# Patient Record
Sex: FEMALE | Race: WHITE | NOT HISPANIC OR LATINO
[De-identification: names, ages, dates, MRNs, and addresses within clinical notes are randomized per-mention and may not be internally consistent; named-entity substitution may affect disease eponyms.]

---

## 2017-01-05 ENCOUNTER — OTHER (OUTPATIENT)
Age: 32
End: 2017-01-05

## 2017-01-25 ENCOUNTER — APPOINTMENT (OUTPATIENT)
Dept: NEUROLOGY | Facility: CLINIC | Age: 32
End: 2017-01-25

## 2017-01-25 VITALS
HEART RATE: 91 BPM | OXYGEN SATURATION: 97 % | BODY MASS INDEX: 53.92 KG/M2 | DIASTOLIC BLOOD PRESSURE: 64 MMHG | SYSTOLIC BLOOD PRESSURE: 95 MMHG | WEIGHT: 293 LBS | HEIGHT: 62 IN

## 2017-01-25 RX ORDER — LEVETIRACETAM 750 MG/1
750 TABLET, FILM COATED ORAL TWICE DAILY
Qty: 120 | Refills: 5 | Status: ACTIVE | COMMUNITY
Start: 2017-01-25 | End: 1900-01-01

## 2017-01-25 RX ORDER — LAMOTRIGINE 25 MG/1
25 TABLET ORAL
Refills: 0 | Status: ACTIVE | COMMUNITY

## 2017-02-01 ENCOUNTER — OUTPATIENT (OUTPATIENT)
Dept: OUTPATIENT SERVICES | Facility: HOSPITAL | Age: 32
LOS: 1 days | End: 2017-02-01
Payer: COMMERCIAL

## 2017-02-01 DIAGNOSIS — Z90.89 ACQUIRED ABSENCE OF OTHER ORGANS: Chronic | ICD-10-CM

## 2017-02-01 PROCEDURE — 70553 MRI BRAIN STEM W/O & W/DYE: CPT | Mod: 26

## 2017-02-01 PROCEDURE — A9585: CPT

## 2017-02-01 PROCEDURE — 70553 MRI BRAIN STEM W/O & W/DYE: CPT

## 2017-02-01 PROCEDURE — 76376 3D RENDER W/INTRP POSTPROCES: CPT | Mod: 26

## 2017-02-06 ENCOUNTER — INPATIENT (INPATIENT)
Facility: HOSPITAL | Age: 32
LOS: 14 days | Discharge: HOME | DRG: 101 | End: 2017-02-21
Attending: PSYCHIATRY & NEUROLOGY | Admitting: PSYCHIATRY & NEUROLOGY
Payer: COMMERCIAL

## 2017-02-06 VITALS — TEMPERATURE: 98 F | WEIGHT: 293 LBS

## 2017-02-06 DIAGNOSIS — R63.8 OTHER SYMPTOMS AND SIGNS CONCERNING FOOD AND FLUID INTAKE: ICD-10-CM

## 2017-02-06 DIAGNOSIS — R56.9 UNSPECIFIED CONVULSIONS: ICD-10-CM

## 2017-02-06 DIAGNOSIS — I45.6 PRE-EXCITATION SYNDROME: ICD-10-CM

## 2017-02-06 DIAGNOSIS — E66.01 MORBID (SEVERE) OBESITY DUE TO EXCESS CALORIES: ICD-10-CM

## 2017-02-06 DIAGNOSIS — Z90.89 ACQUIRED ABSENCE OF OTHER ORGANS: Chronic | ICD-10-CM

## 2017-02-06 DIAGNOSIS — E78.5 HYPERLIPIDEMIA, UNSPECIFIED: ICD-10-CM

## 2017-02-06 DIAGNOSIS — Z41.8 ENCOUNTER FOR OTHER PROCEDURES FOR PURPOSES OTHER THAN REMEDYING HEALTH STATE: ICD-10-CM

## 2017-02-06 LAB
ANION GAP SERPL CALC-SCNC: 9 MMOL/L — SIGNIFICANT CHANGE UP (ref 9–16)
BUN SERPL-MCNC: 16 MG/DL — SIGNIFICANT CHANGE UP (ref 7–23)
CALCIUM SERPL-MCNC: 8.4 MG/DL — LOW (ref 8.5–10.5)
CHLORIDE SERPL-SCNC: 105 MMOL/L — SIGNIFICANT CHANGE UP (ref 96–108)
CO2 SERPL-SCNC: 27 MMOL/L — SIGNIFICANT CHANGE UP (ref 22–31)
CREAT SERPL-MCNC: 0.62 MG/DL — SIGNIFICANT CHANGE UP (ref 0.5–1.3)
GLUCOSE SERPL-MCNC: 120 MG/DL — HIGH (ref 70–99)
HCG UR QL: NEGATIVE — SIGNIFICANT CHANGE UP
HCT VFR BLD CALC: 38.8 % — SIGNIFICANT CHANGE UP (ref 34.5–45)
HGB BLD-MCNC: 13 G/DL — SIGNIFICANT CHANGE UP (ref 11.5–15.5)
MAGNESIUM SERPL-MCNC: 2.1 MG/DL — SIGNIFICANT CHANGE UP (ref 1.6–2.4)
MCHC RBC-ENTMCNC: 27.7 PG — SIGNIFICANT CHANGE UP (ref 27–34)
MCHC RBC-ENTMCNC: 33.5 G/DL — SIGNIFICANT CHANGE UP (ref 32–36)
MCV RBC AUTO: 82.7 FL — SIGNIFICANT CHANGE UP (ref 80–100)
PLATELET # BLD AUTO: 226 K/UL — SIGNIFICANT CHANGE UP (ref 150–400)
POTASSIUM SERPL-MCNC: 3.7 MMOL/L — SIGNIFICANT CHANGE UP (ref 3.5–5.3)
POTASSIUM SERPL-SCNC: 3.7 MMOL/L — SIGNIFICANT CHANGE UP (ref 3.5–5.3)
RBC # BLD: 4.69 M/UL — SIGNIFICANT CHANGE UP (ref 3.8–5.2)
RBC # FLD: 13.6 % — SIGNIFICANT CHANGE UP (ref 10.3–16.9)
SODIUM SERPL-SCNC: 141 MMOL/L — SIGNIFICANT CHANGE UP (ref 135–145)
WBC # BLD: 9.2 K/UL — SIGNIFICANT CHANGE UP (ref 3.8–10.5)
WBC # FLD AUTO: 9.2 K/UL — SIGNIFICANT CHANGE UP (ref 3.8–10.5)

## 2017-02-06 PROCEDURE — 93010 ELECTROCARDIOGRAM REPORT: CPT

## 2017-02-06 RX ORDER — LAMOTRIGINE 25 MG/1
25 TABLET, ORALLY DISINTEGRATING ORAL DAILY
Qty: 0 | Refills: 0 | Status: DISCONTINUED | OUTPATIENT
Start: 2017-02-06 | End: 2017-02-07

## 2017-02-06 RX ORDER — LEVETIRACETAM 250 MG/1
1500 TABLET, FILM COATED ORAL
Qty: 0 | Refills: 0 | Status: DISCONTINUED | OUTPATIENT
Start: 2017-02-06 | End: 2017-02-09

## 2017-02-06 RX ORDER — ESCITALOPRAM OXALATE 10 MG/1
10 TABLET, FILM COATED ORAL DAILY
Qty: 0 | Refills: 0 | Status: DISCONTINUED | OUTPATIENT
Start: 2017-02-06 | End: 2017-02-06

## 2017-02-06 RX ORDER — HEPARIN SODIUM 5000 [USP'U]/ML
7500 INJECTION INTRAVENOUS; SUBCUTANEOUS EVERY 8 HOURS
Qty: 0 | Refills: 0 | Status: DISCONTINUED | OUTPATIENT
Start: 2017-02-06 | End: 2017-02-21

## 2017-02-06 RX ADMIN — LEVETIRACETAM 1500 MILLIGRAM(S): 250 TABLET, FILM COATED ORAL at 22:38

## 2017-02-06 NOTE — H&P ADULT. - PROBLEM SELECTOR PLAN 2
Pt reports hx of ablation that did not resolve issue.  Pt also had a recent Holter monitor for 30 days that was insignificant for arrhythmias.    - F/u EKG Pt reports hx of ablation that did not resolve issue.  Pt also had a recent Holter monitor for 30 days that was insignificant for arrhythmias.    - F/u EKG  - monitor in [facility] on telemetry.

## 2017-02-06 NOTE — H&P ADULT. - PROBLEM SELECTOR PLAN 1
Previous vEEG showing R temporal sharp waves during sleep. Previous MRIs showed no significant findings. MRI brain w/o contrast on 11/28 showing hyperintensity along the anterior and mesial right cortical temporal lobe.  - Start on VEEG  - C/w Lamictal 150 mg BID  - C/w Keppra 1500 mg BID  - MRI brain w/wo con, epi protocol Previous vEEG showing R temporal sharp waves during sleep. MRI brain w/o contrast on 11/28 showing hyperintensity along the anterior and mesial right cortical temporal lobe.  - Start on VEEG  - C/w Lamictal 150 mg BID  - C/w Keppra 1500 mg BID  - MRI brain w/wo con, epi protocol

## 2017-02-06 NOTE — H&P ADULT. - ASSESSMENT
32 y/o RH F PMHx WPW (s/p ablation), HLD, morbid obesity, presents w/ 7 months of intermittent left hand shaking and memory loss. 30 y/o RH F PMHx WPW (s/p ablation), HLD, morbid obesity, presents w/ 7 months of intermittent left hand shaking and memory loss.

## 2017-02-06 NOTE — H&P ADULT. - RS GEN PE MLT RESP DETAILS PC
good air movement/breath sounds equal/no wheezes/normal/airway patent/clear to auscultation bilaterally/no rales
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
## 2017-02-07 PROCEDURE — 95951: CPT | Mod: 26

## 2017-02-07 PROCEDURE — 99222 1ST HOSP IP/OBS MODERATE 55: CPT

## 2017-02-07 RX ORDER — LAMOTRIGINE 25 MG/1
150 TABLET, ORALLY DISINTEGRATING ORAL EVERY 12 HOURS
Qty: 0 | Refills: 0 | Status: DISCONTINUED | OUTPATIENT
Start: 2017-02-07 | End: 2017-02-15

## 2017-02-07 RX ORDER — LAMOTRIGINE 25 MG/1
150 TABLET, ORALLY DISINTEGRATING ORAL
Qty: 0 | Refills: 0 | Status: DISCONTINUED | OUTPATIENT
Start: 2017-02-07 | End: 2017-02-07

## 2017-02-07 RX ORDER — ACETAMINOPHEN 500 MG
650 TABLET ORAL ONCE
Qty: 0 | Refills: 0 | Status: COMPLETED | OUTPATIENT
Start: 2017-02-07 | End: 2017-02-07

## 2017-02-07 RX ADMIN — LEVETIRACETAM 1500 MILLIGRAM(S): 250 TABLET, FILM COATED ORAL at 17:06

## 2017-02-07 RX ADMIN — LAMOTRIGINE 150 MILLIGRAM(S): 25 TABLET, ORALLY DISINTEGRATING ORAL at 23:39

## 2017-02-07 RX ADMIN — LAMOTRIGINE 150 MILLIGRAM(S): 25 TABLET, ORALLY DISINTEGRATING ORAL at 12:55

## 2017-02-07 RX ADMIN — Medication 650 MILLIGRAM(S): at 09:04

## 2017-02-07 RX ADMIN — LEVETIRACETAM 1500 MILLIGRAM(S): 250 TABLET, FILM COATED ORAL at 06:32

## 2017-02-07 NOTE — PROGRESS NOTE ADULT - ASSESSMENT
32 y/o RH F PMHx WPW (s/p ablation), HLD, morbid obesity, presents w/ 7 months of intermittent left hand shaking and memory loss.

## 2017-02-07 NOTE — DIETITIAN INITIAL EVALUATION ADULT. - OTHER INFO
Pt admitted for 48hr VEEG; for seizure like activity. Pt has had 7 months of handshaking and memory loss. Pt began taking Keppra 7 months ago when her appetite started to decrease. PTA pt consumed a regular diet of mostly  cuisine; cereal for breakfast, rice and dempsey for lunch, vegetables and protein for dinner. Pt currently ordered for a regular diet. Pt reports fair appetite; consuming ~50% of needs from meals. No N/V/D/C, pain w/ swallowing or mechanical issues. Skin WDL.

## 2017-02-07 NOTE — DIETITIAN INITIAL EVALUATION ADULT. - PROBLEM SELECTOR PLAN 1
Previous vEEG showing R temporal sharp waves during sleep. Previous MRIs showed no significant findings. MRI brain w/o contrast on 11/28 showing hyperintensity along the anterior and mesial right cortical temporal lobe.  - Start on VEEG  - C/w Lamictal 150 mg BID  - C/w Keppra 1500 mg BID  - MRI brain w/wo con, epi protocol

## 2017-02-07 NOTE — DIETITIAN INITIAL EVALUATION ADULT. - NS AS NUTRI INTERV ED CONTENT
Recommended modifications/Nutrition relationship to health/disease/Purpose of the nutrition education

## 2017-02-07 NOTE — PROGRESS NOTE ADULT - SUBJECTIVE AND OBJECTIVE BOX
Hospital Course:  30 y/o RH F PMHx WPW (s/p ablation), HLD, morbid obesity, presents w/ 7 months of intermittent left hand shaking and memory loss. 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 The nurses with her told her that she had b/l facial palsy, had left hand shaking and confusion/unresponsiveness for a few minutes; no loss of consciousness or bowel/urinary incontinence. After this first episode, patient was told by ED that it was likely her WPW. Pt reports that since this episode, she has had nearly 50 witnessed similar episodes. After these episodes she has noticed that she is lethargic for a few hours.  She had an MRI brain w/w/o contrast on 8/2/16 which was normal.  On 9/26 patient had a 30 day cardiac monitor which did not show any arrhythmias. On 10/1 she had a continuous vEEG that showed R temporal sharp waves during sleep. On 10/17, patient was started on Lexapro as her doctor thought this was possibly anxiety related. She is currently on [address] [facility] for cardiac monitoring while her antiepileptic mediations are adjusted.     Overnight Events: Incorrect Lamictal dose entered.  Pt normally on 150 mg BID, but was ordered as 25 mg.  Pt took one dose at home prior to coming to hospital.  Did not receive evening dose, Dr. Summers unable to be reached O/N by night team for clarification.  However, this morning, Dr. Dyson clarified that he was on call.  Pt did not experience seizure O/N.     Subjective: Patient seen and examined at bedside. Pt denies further seizure episodes.  Denies HA, changes in vision, CP, SOB, palpitations, abdominal pain.     [OBJECTIVE]:    Vital Signs:  T(F): 98.4, Max: 98.4 (02-07 @ 00:40)  HR: 92 (80 - 116)  BP: 124/74 (115/61 - 146/71)  BP(mean): 94 (81 - 97)  RR: 17 (16 - 19)  SpO2: 96% (94% - 97%)  Wt(kg): --  CVP(cm H2O): --      CAPILLARY BLOOD GLUCOSE      Physical Exam:    General: NAD, Comfortable, Pleasant  HEENT: PERRL, no scleral icterus, no ptosis, MMM, VEEG leads in place  Respiratory: CTA b/l, no wheezes, rales or rhonchi  Cardiovascular: Regular, +S1 + S2  Abdomen: Soft, NT, normoactive bowel sounds  Extremities: pulses equal, no calf tenderness  Skin: No rashes  Neurological: follows commands, moves all extremities    Medications:  MEDICATIONS  (STANDING):  heparin  Injectable 7500Unit(s) SubCutaneous every 8 hours  levETIRAcetam 1500milliGRAM(s) Oral two times a day  lamoTRIgine 150milliGRAM(s) Oral every 12 hours    MEDICATIONS  (PRN):      Allergies    No Known Allergies    Intolerances        Labs:                        13.0   9.2   )-----------( 226      ( 06 Feb 2017 18:31 )             38.8     06 Feb 2017 18:31    141    |  105    |  16     ----------------------------<  120    3.7     |  27     |  0.62     Ca    8.4        06 Feb 2017 18:31  Mg     2.1       06 Feb 2017 18:31            Radiology and other tests:    T(F): 98.4, Max: 98.4 (02-07 @ 00:40)  HR: 92 (80 - 116)  BP: 124/74 (115/61 - 146/71)  BP(mean): 94 (81 - 97)  RR: 17 (16 - 19)  SpO2: 96% (94% - 97%)  Wt(kg): --  CVP(cm H2O): -- Hospital Course:  32 y/o RH F PMHx WPW (s/p ablation), HLD, morbid obesity, presents w/ 7 months of intermittent left hand shaking and memory loss. 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 The nurses with her told her that she had b/l facial palsy, had left hand shaking and confusion/unresponsiveness for a few minutes; no loss of consciousness or bowel/urinary incontinence. After this first episode, patient was told by ED that it was likely her WPW. Pt reports that since this episode, she has had nearly 50 witnessed similar episodes. After these episodes she has noticed that she is lethargic for a few hours.  She had an MRI brain w/w/o contrast on 8/2/16 which was normal.  On 9/26 patient had a 30 day cardiac monitor which did not show any arrhythmias. On 10/1 she had a continuous vEEG that showed R temporal sharp waves during sleep. On 10/17, patient was started on Lexapro as her doctor thought this was possibly anxiety related. She is currently on [facility] for cardiac monitoring while her antiepileptic mediations are adjusted.     Overnight Events: Incorrect Lamictal dose entered.  Pt normally on 150 mg BID, but was ordered as 25 mg.  Pt took one dose at home prior to coming to hospital.  Did not receive evening dose, Dr. Summers unable to be reached O/N by night team for clarification.  However, this morning, Dr. Dyson clarified that he was on call.      Subjective: Patient seen and examined at bedside. Pt did not experience seizure O/N. Denies HA, changes in vision, CP, SOB, palpitations, abdominal pain.     [OBJECTIVE]:    Vital Signs:  T(F): 98.4, Max: 98.4 (02-07 @ 00:40)  HR: 92 (80 - 116)  BP: 124/74 (115/61 - 146/71)  BP(mean): 94 (81 - 97)  RR: 17 (16 - 19)  SpO2: 96% (94% - 97%)  Wt(kg): --  CVP(cm H2O): --      CAPILLARY BLOOD GLUCOSE      Physical Exam:    General: NAD, Comfortable, Pleasant  HEENT: PERRL, no scleral icterus, no ptosis, MMM, VEEG leads in place  Respiratory: CTA b/l, no wheezes, rales or rhonchi  Cardiovascular: Regular, +S1 + S2  Abdomen: Soft, NT, normoactive bowel sounds  Extremities: pulses equal, no calf tenderness  Skin: No rashes  Neurological: follows commands, moves all extremities    Medications:  MEDICATIONS  (STANDING):  heparin  Injectable 7500Unit(s) SubCutaneous every 8 hours  levETIRAcetam 1500milliGRAM(s) Oral two times a day  lamoTRIgine 150milliGRAM(s) Oral every 12 hours    MEDICATIONS  (PRN):      Allergies    No Known Allergies    Intolerances        Labs:                        13.0   9.2   )-----------( 226      ( 06 Feb 2017 18:31 )             38.8     06 Feb 2017 18:31    141    |  105    |  16     ----------------------------<  120    3.7     |  27     |  0.62     Ca    8.4        06 Feb 2017 18:31  Mg     2.1       06 Feb 2017 18:31            Radiology and other tests:    T(F): 98.4, Max: 98.4 (02-07 @ 00:40)  HR: 92 (80 - 116)  BP: 124/74 (115/61 - 146/71)  BP(mean): 94 (81 - 97)  RR: 17 (16 - 19)  SpO2: 96% (94% - 97%)  Wt(kg): --  CVP(cm H2O): --

## 2017-02-07 NOTE — DIETITIAN INITIAL EVALUATION ADULT. - ENERGY NEEDS
Height: 157.48cm Weight: 142kg, IBW 50kg+/-10%, %%, BMI 57.3  IBW used to calculate energy needs due to pt's current body weight exceeding 120% of IBW   Increased kcal/protein needs due to obesity. Height: 157.48cm Weight: 142kg, IBW 50kg+/-10%, %%, BMI 57.3  IBW used to calculate energy needs due to pt's current body weight exceeding 120% of IBW   ABW used to determine kcal needs secondary to morbid obesity & ASPEN guidelines:  11-14 kcal/kg ABW (0653-0907 kcal).   Increased protein needs due to obesity.

## 2017-02-07 NOTE — DIETITIAN INITIAL EVALUATION ADULT. - NS AS NUTRI INTERV ED CONTENT3
Nutrition relationship to health/disease/Purpose of the nutrition education/Healthful diet, wt loss tips, meal preparation tips, and increased physical activity/Recommended modifications

## 2017-02-07 NOTE — DIETITIAN INITIAL EVALUATION ADULT. - PROBLEM SELECTOR PLAN 2
Pt reports hx of ablation that did not resolve issue.  Pt also had a recent Holter monitor for 30 days that was insignificant for arrhythmias.    - F/u EKG

## 2017-02-08 PROCEDURE — 99232 SBSQ HOSP IP/OBS MODERATE 35: CPT

## 2017-02-08 PROCEDURE — 95951: CPT | Mod: 26

## 2017-02-08 RX ADMIN — LEVETIRACETAM 1500 MILLIGRAM(S): 250 TABLET, FILM COATED ORAL at 18:29

## 2017-02-08 RX ADMIN — LAMOTRIGINE 150 MILLIGRAM(S): 25 TABLET, ORALLY DISINTEGRATING ORAL at 12:15

## 2017-02-08 RX ADMIN — LEVETIRACETAM 1500 MILLIGRAM(S): 250 TABLET, FILM COATED ORAL at 07:07

## 2017-02-08 NOTE — PROGRESS NOTE ADULT - PROBLEM SELECTOR PLAN 2
Pt reports hx of ablation that did not resolve issue.  Pt also had a recent Holter monitor for 30 days that was insignificant for arrhythmias.    - continue to monitor

## 2017-02-08 NOTE — PROGRESS NOTE ADULT - SUBJECTIVE AND OBJECTIVE BOX
INTERVAL HPI/OVERNIGHT EVENTS:    OVERNIGHT: No overnight events.  SUBJECTIVE: Patient seen and examined at bedside.     ROS:  CV: Denies chest pain  Resp: Denies SOB  GI: Denies abdominal pain, constipation, diarrhea, nausea, vomiting  : Denies dysuria  ID: Denies fevers, chills  MSK: Denies joint pain     OBJECTIVE:    VITAL SIGNS:  ICU Vital Signs Last 24 Hrs  T(C): 36.7, Max: 36.9 (02-07 @ 13:26)  T(F): 98.1, Max: 98.4 (02-07 @ 13:26)  HR: 90 (84 - 104)  BP: 112/56 (112/56 - 137/65)  BP(mean): 77 (77 - 94)  ABP: --  ABP(mean): --  RR: 19 (16 - 19)  SpO2: 95% (92% - 96%)        I & Os for current day (as of 02-08 @ 06:02)  =============================================  IN: 100 ml / OUT: 0 ml / NET: 100 ml    CAPILLARY BLOOD GLUCOSE      PHYSICAL EXAM:    General: NAD, comfortable  HEENT: NCAT, PERRL, clear conjunctiva, no scleral icterus  Neck: supple, no JVD  Respiratory: CTA b/l, no wheezing, rhonchi, rales  Cardiovascular: RRR, normal S1S2, no M/R/G  Abdomen: soft, NT/ND, bowel sounds in all four quadrants, no palpable masses  Extremities: WWP, no clubbing, cyanosis, or edema  Neuro:     MEDICATIONS:  MEDICATIONS  (STANDING):  heparin  Injectable 7500Unit(s) SubCutaneous every 8 hours  levETIRAcetam 1500milliGRAM(s) Oral two times a day  lamoTRIgine 150milliGRAM(s) Oral every 12 hours    MEDICATIONS  (PRN):      ALLERGIES:  Allergies    No Known Allergies    Intolerances        LABS:                        13.0   9.2   )-----------( 226      ( 06 Feb 2017 18:31 )             38.8     06 Feb 2017 18:31    141    |  105    |  16     ----------------------------<  120    3.7     |  27     |  0.62     Ca    8.4        06 Feb 2017 18:31  Mg     2.1       06 Feb 2017 18:31            RADIOLOGY & ADDITIONAL TESTS: Reviewed. INTERVAL HPI/OVERNIGHT EVENTS:    OVERNIGHT: No overnight events.  SUBJECTIVE: Patient seen and examined at bedside. Reports she had a frontal headache yesterday that resolved on its own. Denies having any neurologic episodes that she came in with. Denies any focal neurologic deficits such as weakness or numbness/tingling sensation      ROS:  CV: Denies chest pain  Resp: Denies SOB  GI: Had a BM yesterday. Denies abdominal pain, constipation, diarrhea, nausea, vomiting  : Denies dysuria  ID: Denies fevers, chills  MSK: Denies joint pain     OBJECTIVE:    VITAL SIGNS:  ICU Vital Signs Last 24 Hrs  T(C): 36.7, Max: 36.9 (02-07 @ 13:26)  T(F): 98.1, Max: 98.4 (02-07 @ 13:26)  HR: 90 (84 - 104)  BP: 112/56 (112/56 - 137/65)  BP(mean): 77 (77 - 94)  ABP: --  ABP(mean): --  RR: 19 (16 - 19)  SpO2: 95% (92% - 96%)        I & Os for current day (as of 02-08 @ 06:02)  =============================================  IN: 100 ml / OUT: 0 ml / NET: 100 ml    CAPILLARY BLOOD GLUCOSE      PHYSICAL EXAM:    General: NAD, comfortable, pleasant, obese   HEENT: NCAT, PERRL, clear conjunctiva, no scleral icterus  Neck: supple, no JVD  Respiratory: trace, no wheezing, rhonchi, rales  Cardiovascular: RRR, normal S1S2, no M/R/G  Abdomen: soft, NT/ND, bowel sounds in all four quadrants, no palpable masses  Extremities: WWP, no clubbing, cyanosis, or edema  Neuro:     MEDICATIONS:  MEDICATIONS  (STANDING):  heparin  Injectable 7500Unit(s) SubCutaneous every 8 hours  levETIRAcetam 1500milliGRAM(s) Oral two times a day  lamoTRIgine 150milliGRAM(s) Oral every 12 hours    MEDICATIONS  (PRN):      ALLERGIES:  Allergies    No Known Allergies    Intolerances        LABS:                        13.0   9.2   )-----------( 226      ( 06 Feb 2017 18:31 )             38.8     06 Feb 2017 18:31    141    |  105    |  16     ----------------------------<  120    3.7     |  27     |  0.62     Ca    8.4        06 Feb 2017 18:31  Mg     2.1       06 Feb 2017 18:31            RADIOLOGY & ADDITIONAL TESTS: Reviewed. INTERVAL HPI/OVERNIGHT EVENTS:    OVERNIGHT: No overnight events.  SUBJECTIVE: Patient seen and examined at bedside. Reports she had a frontal headache yesterday that resolved on its own. Denies having any neurologic episodes that she came in with. Denies any focal neurologic deficits such as weakness or numbness/tingling sensation      ROS:  CV: Denies chest pain  Resp: Denies SOB  GI: Had a BM yesterday. Denies abdominal pain, constipation, diarrhea, nausea, vomiting  : Denies dysuria  ID: Denies fevers, chills  MSK: Denies joint pain     OBJECTIVE:    VITAL SIGNS:  ICU Vital Signs Last 24 Hrs  T(C): 36.7, Max: 36.9 (02-07 @ 13:26)  T(F): 98.1, Max: 98.4 (02-07 @ 13:26)  HR: 90 (84 - 104)  BP: 112/56 (112/56 - 137/65)  BP(mean): 77 (77 - 94)  ABP: --  ABP(mean): --  RR: 19 (16 - 19)  SpO2: 95% (92% - 96%)        I & Os for current day (as of 02-08 @ 06:02)  =============================================  IN: 100 ml / OUT: 0 ml / NET: 100 ml    CAPILLARY BLOOD GLUCOSE      PHYSICAL EXAM:    General: NAD, comfortable, pleasant, obese   HEENT: NCAT, PERRL, clear conjunctiva, no scleral icterus  Neck: supple, no JVD  Respiratory: trace crackles heard on the left base, no wheezing and rhonchi,   Cardiovascular: RRR, normal S1S2, no M/R/G  Abdomen: soft, NT/ND, +bowel sounds, no palpable masses  Extremities: WWP, no clubbing, cyanosis, or edema  Neuro: CN 2-12 intact FTN intact B/L     MEDICATIONS:  MEDICATIONS  (STANDING):  heparin  Injectable 7500Unit(s) SubCutaneous every 8 hours  levETIRAcetam 1500milliGRAM(s) Oral two times a day  lamoTRIgine 150milliGRAM(s) Oral every 12 hours    MEDICATIONS  (PRN):      ALLERGIES:  Allergies    No Known Allergies    Intolerances        LABS:                        13.0   9.2   )-----------( 226      ( 06 Feb 2017 18:31 )             38.8     06 Feb 2017 18:31    141    |  105    |  16     ----------------------------<  120    3.7     |  27     |  0.62     Ca    8.4        06 Feb 2017 18:31  Mg     2.1       06 Feb 2017 18:31            RADIOLOGY & ADDITIONAL TESTS: Reviewed.

## 2017-02-08 NOTE — PROGRESS NOTE ADULT - PROBLEM SELECTOR PLAN 1
Previous vEEG showing R temporal sharp waves during sleep. Previous MRIs showed no significant findings. MRI brain w/o contrast on 11/28 showing hyperintensity along the anterior and mesial right cortical temporal lobe.  - c/w VEEG  - C/w Lamictal 150 mg BID  - C/w Keppra 1500 mg BID  - will conduct sleep deprivation study tonight to stimulate activity; patient aware - c/w VEEG  - C/w Lamictal 150 mg BID  - C/w Keppra 1500 mg BID  - will conduct sleep deprivation study tonight; patient aware  - neuropsych testing today for pre-surgical work-up.

## 2017-02-09 LAB
ANION GAP SERPL CALC-SCNC: 10 MMOL/L — SIGNIFICANT CHANGE UP (ref 9–16)
BUN SERPL-MCNC: 15 MG/DL — SIGNIFICANT CHANGE UP (ref 7–23)
CALCIUM SERPL-MCNC: 8 MG/DL — LOW (ref 8.5–10.5)
CHLORIDE SERPL-SCNC: 105 MMOL/L — SIGNIFICANT CHANGE UP (ref 96–108)
CO2 SERPL-SCNC: 24 MMOL/L — SIGNIFICANT CHANGE UP (ref 22–31)
CREAT SERPL-MCNC: 0.65 MG/DL — SIGNIFICANT CHANGE UP (ref 0.5–1.3)
GLUCOSE SERPL-MCNC: 107 MG/DL — HIGH (ref 70–99)
HCT VFR BLD CALC: 36.6 % — SIGNIFICANT CHANGE UP (ref 34.5–45)
HGB BLD-MCNC: 12.2 G/DL — SIGNIFICANT CHANGE UP (ref 11.5–15.5)
MAGNESIUM SERPL-MCNC: 2.3 MG/DL — SIGNIFICANT CHANGE UP (ref 1.6–2.4)
MCHC RBC-ENTMCNC: 27.5 PG — SIGNIFICANT CHANGE UP (ref 27–34)
MCHC RBC-ENTMCNC: 33.3 G/DL — SIGNIFICANT CHANGE UP (ref 32–36)
MCV RBC AUTO: 82.4 FL — SIGNIFICANT CHANGE UP (ref 80–100)
PLATELET # BLD AUTO: 203 K/UL — SIGNIFICANT CHANGE UP (ref 150–400)
POTASSIUM SERPL-MCNC: 3.6 MMOL/L — SIGNIFICANT CHANGE UP (ref 3.5–5.3)
POTASSIUM SERPL-SCNC: 3.6 MMOL/L — SIGNIFICANT CHANGE UP (ref 3.5–5.3)
RBC # BLD: 4.44 M/UL — SIGNIFICANT CHANGE UP (ref 3.8–5.2)
RBC # FLD: 13.6 % — SIGNIFICANT CHANGE UP (ref 10.3–16.9)
SODIUM SERPL-SCNC: 139 MMOL/L — SIGNIFICANT CHANGE UP (ref 135–145)
WBC # BLD: 7.4 K/UL — SIGNIFICANT CHANGE UP (ref 3.8–10.5)
WBC # FLD AUTO: 7.4 K/UL — SIGNIFICANT CHANGE UP (ref 3.8–10.5)

## 2017-02-09 PROCEDURE — 99232 SBSQ HOSP IP/OBS MODERATE 35: CPT

## 2017-02-09 PROCEDURE — 95951: CPT | Mod: 26

## 2017-02-09 RX ORDER — POTASSIUM CHLORIDE 20 MEQ
40 PACKET (EA) ORAL ONCE
Qty: 0 | Refills: 0 | Status: COMPLETED | OUTPATIENT
Start: 2017-02-09 | End: 2017-02-09

## 2017-02-09 RX ORDER — LEVETIRACETAM 250 MG/1
1000 TABLET, FILM COATED ORAL EVERY 12 HOURS
Qty: 0 | Refills: 0 | Status: DISCONTINUED | OUTPATIENT
Start: 2017-02-09 | End: 2017-02-11

## 2017-02-09 RX ADMIN — Medication 40 MILLIEQUIVALENT(S): at 13:13

## 2017-02-09 RX ADMIN — LAMOTRIGINE 150 MILLIGRAM(S): 25 TABLET, ORALLY DISINTEGRATING ORAL at 23:16

## 2017-02-09 RX ADMIN — LAMOTRIGINE 150 MILLIGRAM(S): 25 TABLET, ORALLY DISINTEGRATING ORAL at 13:14

## 2017-02-09 RX ADMIN — LEVETIRACETAM 1500 MILLIGRAM(S): 250 TABLET, FILM COATED ORAL at 05:44

## 2017-02-09 RX ADMIN — LAMOTRIGINE 150 MILLIGRAM(S): 25 TABLET, ORALLY DISINTEGRATING ORAL at 00:28

## 2017-02-09 RX ADMIN — LEVETIRACETAM 1000 MILLIGRAM(S): 250 TABLET, FILM COATED ORAL at 18:21

## 2017-02-09 NOTE — PROGRESS NOTE ADULT - SUBJECTIVE AND OBJECTIVE BOX
INTERVAL HPI/OVERNIGHT EVENTS:    OVERNIGHT: No overnight events.  SUBJECTIVE: Patient seen and examined at bedside.     ROS:  CV: Denies chest pain  Resp: Denies SOB  GI: Denies abdominal pain, constipation, diarrhea, nausea, vomiting  : Denies dysuria  ID: Denies fevers, chills  MSK: Denies joint pain     OBJECTIVE:    VITAL SIGNS:  ICU Vital Signs Last 24 Hrs  T(C): 36.6, Max: 37 (02-08 @ 22:15)  T(F): 97.9, Max: 98.6 (02-08 @ 22:15)  HR: 91 (86 - 97)  BP: 123/63 (123/63 - 144/67)  BP(mean): 85 (85 - 95)  ABP: --  ABP(mean): --  RR: 16 (16 - 18)  SpO2: 96% (95% - 96%)        I & Os for current day (as of 02-09 @ 06:39)  =============================================  IN: 100 ml / OUT: 0 ml / NET: 100 ml    CAPILLARY BLOOD GLUCOSE      PHYSICAL EXAM:    General: NAD, comfortable  HEENT: NCAT, PERRL, clear conjunctiva, no scleral icterus  Neck: supple, no JVD  Respiratory: CTA b/l, no wheezing, rhonchi, rales  Cardiovascular: RRR, normal S1S2, no M/R/G  Abdomen: soft, NT/ND, bowel sounds in all four quadrants, no palpable masses  Extremities: WWP, no clubbing, cyanosis, or edema  Neuro:     MEDICATIONS:  MEDICATIONS  (STANDING):  heparin  Injectable 7500Unit(s) SubCutaneous every 8 hours  levETIRAcetam 1500milliGRAM(s) Oral two times a day  lamoTRIgine 150milliGRAM(s) Oral every 12 hours    MEDICATIONS  (PRN):      ALLERGIES:  Allergies    No Known Allergies    Intolerances        LABS:                RADIOLOGY & ADDITIONAL TESTS: Reviewed. INTERVAL HPI/OVERNIGHT EVENTS:    OVERNIGHT: No overnight events. Patient was sleep deprived until 4 am  SUBJECTIVE: Patient seen and examined at bedside. Patient reports no acute distress. ROS documented below     ROS:  CV: Denies chest pain  Resp: Denies SOB  GI: Denies abdominal pain,  : Denies dysuria  ID: Denies fevers, chills  MSK: Denies joint pain     OBJECTIVE:    VITAL SIGNS:  ICU Vital Signs Last 24 Hrs  T(C): 36.6, Max: 37 (02-08 @ 22:15)  T(F): 97.9, Max: 98.6 (02-08 @ 22:15)  HR: 91 (86 - 97)  BP: 123/63 (123/63 - 144/67)  BP(mean): 85 (85 - 95)  ABP: --  ABP(mean): --  RR: 16 (16 - 18)  SpO2: 96% (95% - 96%)        I & Os for current day (as of 02-09 @ 06:39)  =============================================  IN: 100 ml / OUT: 0 ml / NET: 100 ml    CAPILLARY BLOOD GLUCOSE      PHYSICAL EXAM:    General: NAD, comfortable, pleasant, obese   HEENT: NCAT, PERRL, clear conjunctiva, no scleral icterus  Neck: supple, no JVD  Respiratory: trace crackles heard on the left base, no wheezing and rhonchi,   Cardiovascular: RRR, normal S1S2, no M/R/G  Abdomen: soft, NT/ND, +bowel sounds, no palpable masses  Extremities: WWP, no clubbing, cyanosis, or edema  Neuro: CN 2-12 intact FTN intact B/L     MEDICATIONS:  MEDICATIONS  (STANDING):  heparin  Injectable 7500Unit(s) SubCutaneous every 8 hours  levETIRAcetam 1500milliGRAM(s) Oral two times a day  lamoTRIgine 150milliGRAM(s) Oral every 12 hours    MEDICATIONS  (PRN):      ALLERGIES:  Allergies    No Known Allergies    Intolerances        LABS:                RADIOLOGY & ADDITIONAL TESTS: Reviewed.

## 2017-02-09 NOTE — PROGRESS NOTE ADULT - PROBLEM SELECTOR PLAN 1
-c/w VEEG- patients room is currently off-line, will move patient to another room  - C/w Lamictal 150 mg BID  - will titrate evening dose of keppra to 1000 mg BID once patient is moved to new room Patient with no seizures overnight  -c/w VEEG- patients room is currently off-line, will move patient to another room  - C/w Lamictal 150 mg BID  - will titrate evening dose of keppra to 1000 mg BID once patient is moved to new room

## 2017-02-09 NOTE — PROGRESS NOTE ADULT - ASSESSMENT
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 So far EEG shows right anterior temporal epileptiform discharges, right anterior temporal slowing, and right anterior temporal rhythmic delta activity (suggestive of right anterior temporal hyperexcitability). She has had no clinical or EEG seizure since admission. She is still having spells despite being on good doses of Keppra and Lamictal. Spell characterization is warranted before further medication adjustments are made (my suspicion is that her spells are epileptic in etiology). We will also initiate a pre-surgical work-up given interictal/imaging findings.

## 2017-02-10 LAB
ANION GAP SERPL CALC-SCNC: 11 MMOL/L — SIGNIFICANT CHANGE UP (ref 9–16)
BUN SERPL-MCNC: 11 MG/DL — SIGNIFICANT CHANGE UP (ref 7–23)
CALCIUM SERPL-MCNC: 8.4 MG/DL — LOW (ref 8.5–10.5)
CHLORIDE SERPL-SCNC: 104 MMOL/L — SIGNIFICANT CHANGE UP (ref 96–108)
CO2 SERPL-SCNC: 25 MMOL/L — SIGNIFICANT CHANGE UP (ref 22–31)
CREAT SERPL-MCNC: 0.65 MG/DL — SIGNIFICANT CHANGE UP (ref 0.5–1.3)
GLUCOSE SERPL-MCNC: 91 MG/DL — SIGNIFICANT CHANGE UP (ref 70–99)
HCT VFR BLD CALC: 38.5 % — SIGNIFICANT CHANGE UP (ref 34.5–45)
HGB BLD-MCNC: 12.8 G/DL — SIGNIFICANT CHANGE UP (ref 11.5–15.5)
MAGNESIUM SERPL-MCNC: 2.4 MG/DL — SIGNIFICANT CHANGE UP (ref 1.6–2.4)
MCHC RBC-ENTMCNC: 27.4 PG — SIGNIFICANT CHANGE UP (ref 27–34)
MCHC RBC-ENTMCNC: 33.2 G/DL — SIGNIFICANT CHANGE UP (ref 32–36)
MCV RBC AUTO: 82.4 FL — SIGNIFICANT CHANGE UP (ref 80–100)
PLATELET # BLD AUTO: 198 K/UL — SIGNIFICANT CHANGE UP (ref 150–400)
POTASSIUM SERPL-MCNC: 4.2 MMOL/L — SIGNIFICANT CHANGE UP (ref 3.5–5.3)
POTASSIUM SERPL-SCNC: 4.2 MMOL/L — SIGNIFICANT CHANGE UP (ref 3.5–5.3)
RBC # BLD: 4.67 M/UL — SIGNIFICANT CHANGE UP (ref 3.8–5.2)
RBC # FLD: 13.7 % — SIGNIFICANT CHANGE UP (ref 10.3–16.9)
SODIUM SERPL-SCNC: 140 MMOL/L — SIGNIFICANT CHANGE UP (ref 135–145)
WBC # BLD: 7.7 K/UL — SIGNIFICANT CHANGE UP (ref 3.8–10.5)
WBC # FLD AUTO: 7.7 K/UL — SIGNIFICANT CHANGE UP (ref 3.8–10.5)

## 2017-02-10 PROCEDURE — 99232 SBSQ HOSP IP/OBS MODERATE 35: CPT

## 2017-02-10 PROCEDURE — 95951: CPT | Mod: 26

## 2017-02-10 RX ADMIN — LEVETIRACETAM 1000 MILLIGRAM(S): 250 TABLET, FILM COATED ORAL at 06:18

## 2017-02-10 RX ADMIN — LAMOTRIGINE 150 MILLIGRAM(S): 25 TABLET, ORALLY DISINTEGRATING ORAL at 11:49

## 2017-02-10 RX ADMIN — LEVETIRACETAM 1000 MILLIGRAM(S): 250 TABLET, FILM COATED ORAL at 19:34

## 2017-02-10 NOTE — PROGRESS NOTE ADULT - PROBLEM SELECTOR PLAN 1
Patient with no seizures or neurologic episodes overnight   -c/w VEEG-   - C/w Lamictal 150 mg BID  - c/w keppra 1000 mg BID -c/w VEEG-   - C/w Lamictal 150 mg BID  - c/w keppra 1000 mg BID  - seizure precautions.  - for convulsive seizure >3 minutes, give ativan 2 mg stat and call Dr. Kiel SAEED.

## 2017-02-10 NOTE — PROGRESS NOTE ADULT - ASSESSMENT
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 So far EEG shows right anterior temporal epileptiform discharges, right anterior temporal slowing, and right anterior temporal rhythmic delta activity (suggestive of right anterior temporal hyperexcitability). She has had no clinical or EEG seizure since admission. Medication currently being titrated. 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 So far EEG shows right anterior temporal epileptiform discharges, right anterior temporal slowing, and right anterior temporal rhythmic delta activity (suggestive of right anterior temporal hyperexcitability). 2/9pm she had one electroclinical seizure, right temporal on EEG and characterized as a "weird" feeling in her left hand. Neuropsych has already evaluated the patient and results and pending. We will attempt to capture more seizures.

## 2017-02-10 NOTE — PROGRESS NOTE ADULT - SUBJECTIVE AND OBJECTIVE BOX
INTERVAL HPI/OVERNIGHT EVENTS:    OVERNIGHT: No overnight events.  SUBJECTIVE: Patient seen and examined at bedside.     ROS:  CV: Denies chest pain  Resp: Denies SOB  GI: Denies abdominal pain, constipation, diarrhea, nausea, vomiting  : Denies dysuria  ID: Denies fevers, chills  MSK: Denies joint pain     OBJECTIVE:    VITAL SIGNS:  ICU Vital Signs Last 24 Hrs  T(C): 36.7, Max: 36.9 (02-09 @ 15:05)  T(F): 98.1, Max: 98.4 (02-09 @ 15:05)  HR: 38 (38 - 94)  BP: 123/64 (119/56 - 139/65)  BP(mean): 86 (80 - 92)  ABP: --  ABP(mean): --  RR: 16 (16 - 16)  SpO2: 96% (95% - 98%)        I & Os for current day (as of 02-10 @ 08:11)  =============================================  IN: 0 ml / OUT: 300 ml / NET: -300 ml    CAPILLARY BLOOD GLUCOSE      PHYSICAL EXAM:    General: NAD, comfortable  HEENT: NCAT, PERRL, clear conjunctiva, no scleral icterus  Neck: supple, no JVD  Respiratory: CTA b/l, no wheezing, rhonchi, rales  Cardiovascular: RRR, normal S1S2, no M/R/G  Abdomen: soft, NT/ND, bowel sounds in all four quadrants, no palpable masses  Extremities: WWP, no clubbing, cyanosis, or edema  Neuro:     MEDICATIONS:  MEDICATIONS  (STANDING):  heparin  Injectable 7500Unit(s) SubCutaneous every 8 hours  lamoTRIgine 150milliGRAM(s) Oral every 12 hours  levETIRAcetam 1000milliGRAM(s) Oral every 12 hours    MEDICATIONS  (PRN):      ALLERGIES:  Allergies    No Known Allergies    Intolerances        LABS:                        12.8   7.7   )-----------( 198      ( 10 Feb 2017 06:27 )             38.5     10 Feb 2017 06:27    140    |  104    |  11     ----------------------------<  91     4.2     |  25     |  0.65     Ca    8.4        10 Feb 2017 06:27  Mg     2.4       10 Feb 2017 06:27            RADIOLOGY & ADDITIONAL TESTS: Reviewed. INTERVAL HPI/OVERNIGHT EVENTS:    OVERNIGHT: No overnight events. evening dose of Keppra decreased  SUBJECTIVE: Patient seen and examined at bedside. Denies any of her prior neurologic episodes occurring. Denies headaches or any focal neurologic deficits     ROS:  CV: Denies chest pain  Resp: Denies SOB  GI: Denies abdominal pain  : Denies dysuria  ID: Denies fevers, chills  OBJECTIVE:    VITAL SIGNS:  ICU Vital Signs Last 24 Hrs  T(C): 36.7, Max: 36.9 (02-09 @ 15:05)  T(F): 98.1, Max: 98.4 (02-09 @ 15:05)  HR: 38 (38 - 94)  BP: 123/64 (119/56 - 139/65)  BP(mean): 86 (80 - 92)  ABP: --  ABP(mean): --  RR: 16 (16 - 16)  SpO2: 96% (95% - 98%)        I & Os for current day (as of 02-10 @ 08:11)  =============================================  IN: 0 ml / OUT: 300 ml / NET: -300 ml    CAPILLARY BLOOD GLUCOSE      PHYSICAL EXAM:    General: NAD, comfortable, pleasant, obese   HEENT: NCAT, PERRL, clear conjunctiva, no scleral icterus  Neck: supple, no JVD  Respiratory: CTA B/L, no wheezing and rhonchi,   Cardiovascular: RRR, normal S1S2, no M/R/G  Abdomen: soft, NT/ND, +bowel sounds, no palpable masses  Extremities: WWP, no clubbing, cyanosis, or edema  Neuro: CN 2-12 intact FTN intact B/L, no sensory defcits    MEDICATIONS  (PRN):      ALLERGIES:  Allergies    No Known Allergies    Intolerances        LABS:                        12.8   7.7   )-----------( 198      ( 10 Feb 2017 06:27 )             38.5     10 Feb 2017 06:27    140    |  104    |  11     ----------------------------<  91     4.2     |  25     |  0.65     Ca    8.4        10 Feb 2017 06:27  Mg     2.4       10 Feb 2017 06:27            RADIOLOGY & ADDITIONAL TESTS: Reviewed. INTERVAL HPI/OVERNIGHT EVENTS:    OVERNIGHT: Patient had one right temporal onset yesterday evening, characterized as a "weird" feeling in her left hand. Of note, this seizure occurred before per PM dose of Keppra was decreased from 1500 mg to 1000 mg.   SUBJECTIVE: Patient seen and examined at bedside. Denies headaches or any focal neurologic deficits     ROS:  CV: Denies chest pain  Resp: Denies SOB  GI: Denies abdominal pain  : Denies dysuria  ID: Denies fevers, chills  OBJECTIVE:    VITAL SIGNS:  ICU Vital Signs Last 24 Hrs  T(C): 36.7, Max: 36.9 (02-09 @ 15:05)  T(F): 98.1, Max: 98.4 (02-09 @ 15:05)  HR: 38 (38 - 94)  BP: 123/64 (119/56 - 139/65)  BP(mean): 86 (80 - 92)  ABP: --  ABP(mean): --  RR: 16 (16 - 16)  SpO2: 96% (95% - 98%)        I & Os for current day (as of 02-10 @ 08:11)  =============================================  IN: 0 ml / OUT: 300 ml / NET: -300 ml    CAPILLARY BLOOD GLUCOSE      PHYSICAL EXAM:    General: NAD, comfortable, pleasant, obese   HEENT: NCAT, PERRL, clear conjunctiva, no scleral icterus  Neck: supple, no JVD  Respiratory: CTA B/L, no wheezing and rhonchi,   Cardiovascular: RRR, normal S1S2, no M/R/G  Abdomen: soft, NT/ND, +bowel sounds, no palpable masses  Extremities: WWP, no clubbing, cyanosis, or edema  Neuro: CN 2-12 intact FTN intact B/L, no sensory defcits    MEDICATIONS  (PRN):      ALLERGIES:  Allergies    No Known Allergies    Intolerances        LABS:                        12.8   7.7   )-----------( 198      ( 10 Feb 2017 06:27 )             38.5     10 Feb 2017 06:27    140    |  104    |  11     ----------------------------<  91     4.2     |  25     |  0.65     Ca    8.4        10 Feb 2017 06:27  Mg     2.4       10 Feb 2017 06:27            RADIOLOGY & ADDITIONAL TESTS: Reviewed.

## 2017-02-11 PROCEDURE — 95951: CPT | Mod: 26

## 2017-02-11 RX ORDER — LEVETIRACETAM 250 MG/1
500 TABLET, FILM COATED ORAL EVERY 12 HOURS
Qty: 0 | Refills: 0 | Status: DISCONTINUED | OUTPATIENT
Start: 2017-02-11 | End: 2017-02-12

## 2017-02-11 RX ADMIN — LEVETIRACETAM 500 MILLIGRAM(S): 250 TABLET, FILM COATED ORAL at 18:10

## 2017-02-11 RX ADMIN — LAMOTRIGINE 150 MILLIGRAM(S): 25 TABLET, ORALLY DISINTEGRATING ORAL at 01:27

## 2017-02-11 RX ADMIN — LEVETIRACETAM 1000 MILLIGRAM(S): 250 TABLET, FILM COATED ORAL at 06:28

## 2017-02-11 RX ADMIN — LAMOTRIGINE 150 MILLIGRAM(S): 25 TABLET, ORALLY DISINTEGRATING ORAL at 12:28

## 2017-02-11 NOTE — PROGRESS NOTE ADULT - ASSESSMENT
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 So far EEG shows right anterior temporal epileptiform discharges, right anterior temporal slowing, and right anterior temporal rhythmic delta activity (suggestive of right anterior temporal hyperexcitability). 2/9pm she had one electroclinical seizure, right temporal on EEG and characterized as a "weird" feeling in her left hand. Neuropsych has already evaluated the patient and results and pending. We will attempt to capture more seizures by titrating medication dose. 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 So far EEG shows right anterior temporal epileptiform discharges, right anterior temporal slowing, and right anterior temporal rhythmic delta activity (suggestive of right anterior temporal hyperexcitability). 2/9pm she had one electroclinical seizure, right temporal on EEG and characterized as a "weird" feeling in her left hand. Neuropsych has already evaluated the patient and results and pending. We will attempt to capture more seizures by tapering meds.

## 2017-02-11 NOTE — PROGRESS NOTE ADULT - PROBLEM SELECTOR PLAN 1
-c/w VEEG-   - C/w Lamictal 150 mg BID  - will decrease Keppra to 500 mg BID   - seizure precautions.  - for convulsive seizure >3 minutes, give ativan 2 mg stat and call Dr. Kiel SAEED.

## 2017-02-11 NOTE — PROGRESS NOTE ADULT - SUBJECTIVE AND OBJECTIVE BOX
INTERVAL HPI/OVERNIGHT EVENTS:    OVERNIGHT: No overnight events.   SUBJECTIVE: Patient seen and examined at bedside. Denies headaches or any focal neurologic deficits     ROS:  CV: Denies chest pain  Resp: Denies SOB  GI: Denies abdominal pain, constipation, diarrhea, nausea, vomiting  : Denies dysuria  ID: Denies fevers, chills    OBJECTIVE:    VITAL SIGNS:  ICU Vital Signs Last 24 Hrs  T(C): 36.7, Max: 36.9 (02-11 @ 00:51)  T(F): 98, Max: 98.5 (02-11 @ 00:51)  HR: 92 (85 - 100)  BP: 125/76 (107/53 - 145/79)  BP(mean): 95 (95 - 104)  ABP: --  ABP(mean): --  RR: 17 (17 - 18)  SpO2: 96% (95% - 96%)      I & Os for 24h ending 02-11 @ 07:00  =============================================  IN: 240 ml / OUT: 0 ml / NET: 240 ml    I & Os for current day (as of 02-11 @ 17:36)  =============================================  IN: 300 ml / OUT: 0 ml / NET: 300 ml    CAPILLARY BLOOD GLUCOSE      PHYSICAL EXAM:    General: NAD, comfortable, pleasant, obese   HEENT: NCAT, PERRL, clear conjunctiva, no scleral icterus  Neck: supple, no JVD  Respiratory: CTA B/L, no wheezing and rhonchi,   Cardiovascular: RRR, normal S1S2, no M/R/G  Abdomen: soft, NT/ND, +bowel sounds, no palpable masses  Extremities: WWP, no clubbing, cyanosis, or edema  Neuro: CN 2-12 intact FTN intact B/L, no sensory deficits    MEDICATIONS:  MEDICATIONS  (STANDING):  heparin  Injectable 7500Unit(s) SubCutaneous every 8 hours  lamoTRIgine 150milliGRAM(s) Oral every 12 hours  levETIRAcetam 500milliGRAM(s) Oral every 12 hours    MEDICATIONS  (PRN):      ALLERGIES:  Allergies    No Known Allergies    Intolerances        LABS:                        12.8   7.7   )-----------( 198      ( 10 Feb 2017 06:27 )             38.5     10 Feb 2017 06:27    140    |  104    |  11     ----------------------------<  91     4.2     |  25     |  0.65     Ca    8.4        10 Feb 2017 06:27  Mg     2.4       10 Feb 2017 06:27            RADIOLOGY & ADDITIONAL TESTS: Reviewed.

## 2017-02-12 PROCEDURE — 95951: CPT | Mod: 26

## 2017-02-12 PROCEDURE — 99232 SBSQ HOSP IP/OBS MODERATE 35: CPT

## 2017-02-12 RX ORDER — LEVETIRACETAM 250 MG/1
250 TABLET, FILM COATED ORAL EVERY 12 HOURS
Qty: 0 | Refills: 0 | Status: COMPLETED | OUTPATIENT
Start: 2017-02-12 | End: 2017-02-13

## 2017-02-12 RX ADMIN — LEVETIRACETAM 250 MILLIGRAM(S): 250 TABLET, FILM COATED ORAL at 17:28

## 2017-02-12 RX ADMIN — LEVETIRACETAM 500 MILLIGRAM(S): 250 TABLET, FILM COATED ORAL at 06:09

## 2017-02-12 RX ADMIN — LAMOTRIGINE 150 MILLIGRAM(S): 25 TABLET, ORALLY DISINTEGRATING ORAL at 13:01

## 2017-02-12 RX ADMIN — LAMOTRIGINE 150 MILLIGRAM(S): 25 TABLET, ORALLY DISINTEGRATING ORAL at 00:07

## 2017-02-12 NOTE — PROGRESS NOTE ADULT - PROBLEM SELECTOR PLAN 1
-c/w VEEG-   - C/w Lamictal 150 mg BID  - Continue with decreased dose, Keppra 500 mg BID   - seizure precautions.  - for convulsive seizure >3 minutes, give ativan 2 mg stat and call Dr. Kiel SAEED. -c/w VEEG-   - C/w Lamictal 150 mg BID  - decrease Keppra to 250 mg BID, then stop it after dose tomorrow morning.   - seizure precautions.  - for convulsive seizure >3 minutes, give ativan 2 mg stat and call Dr. Kiel SAEED.

## 2017-02-12 NOTE — PROGRESS NOTE ADULT - SUBJECTIVE AND OBJECTIVE BOX
Overnight Events: PRATIMA    Subjective: Patient seen and examined at bedside. Patient states she may have had a "even" where her fingers were feeling clumsy, pressed the button after it passed, but did not have any other events or issues. Denies HA, Changes in vision, shakes, seizurelike activity other than as above, SOB, CP, abdominal pain, fevers, chills, or other issues. ROS otherwise negative.     [OBJECTIVE]:    Vital Signs:  T(F): , Max: 98.3 (02-12 @ 00:55)  HR:  (92 - 98)  BP:  (103/53 - 125/76)  BP(mean):  (73 - 95)  RR:  (16 - 20)  SpO2:  (96% - 98%)  Wt(kg): --  CVP(cm H2O): --      I & Os for current day (as of 02-12 @ 11:06)  =============================================  IN: 300 ml / OUT: 0 ml / NET: 300 ml    CAPILLARY BLOOD GLUCOSE      Physcial Exam:  T(F): 97.5  HR: 92  BP: 110/64  RR: 20  SpO2: 98%  Wt(kg): --    General: AO x 3, NAD, Comfortable, Pleasant  HEENT: PERRL/ EOMI, no scleral icterus, no ptosis, MMM  Respiratory: CTA b/l, no wheezes, rales or rhonchi  Cardiovascular: Regular, +S1 + S2  Abdomen: Soft, Obese, NTND, normoactive bowel sounds, no rebound, no guarding, no suprapubic tenderness  Extremities: No cyanosis, no clubbing, no edema, pulses equal, no calf tenderness  Skin: No rashes  Lymphatic: No cervical/supraclavicular LAD  Neurological: CN II-XII grossly intact, follows commands, moves all extremities    Medications:  MEDICATIONS  (STANDING):  heparin  Injectable 7500Unit(s) SubCutaneous every 8 hours  lamoTRIgine 150milliGRAM(s) Oral every 12 hours  levETIRAcetam 500milliGRAM(s) Oral every 12 hours    MEDICATIONS  (PRN):      Allergies:  Allergies    No Known Allergies    Intolerances        Labs:                Radiology and other tests: Overnight Events: PRATIMA    Subjective: Patient seen and examined at bedside. Patient states she may have had a "event" where her fingers were feeling clumsy, pressed the button after it passed, but did not have any other events or issues. Denies HA, Changes in vision, shakes, SOB, CP, abdominal pain, fevers, chills, or other issues. ROS otherwise negative.     [OBJECTIVE]:    Vital Signs:  T(F): , Max: 98.3 (02-12 @ 00:55)  HR:  (92 - 98)  BP:  (103/53 - 125/76)  BP(mean):  (73 - 95)  RR:  (16 - 20)  SpO2:  (96% - 98%)  Wt(kg): --  CVP(cm H2O): --      I & Os for current day (as of 02-12 @ 11:06)  =============================================  IN: 300 ml / OUT: 0 ml / NET: 300 ml    CAPILLARY BLOOD GLUCOSE      Physcial Exam:  T(F): 97.5  HR: 92  BP: 110/64  RR: 20  SpO2: 98%  Wt(kg): --    General: AO x 3, NAD, Comfortable, Pleasant  HEENT: PERRL/ EOMI, no scleral icterus, no ptosis, MMM  Respiratory: CTA b/l, no wheezes, rales or rhonchi  Cardiovascular: Regular, +S1 + S2  Abdomen: Soft, Obese, NTND, normoactive bowel sounds, no rebound, no guarding, no suprapubic tenderness  Extremities: No cyanosis, no clubbing, no edema, pulses equal, no calf tenderness  Skin: No rashes  Lymphatic: No cervical/supraclavicular LAD  Neurological: CN II-XII grossly intact, follows commands, moves all extremities    Medications:  MEDICATIONS  (STANDING):  heparin  Injectable 7500Unit(s) SubCutaneous every 8 hours  lamoTRIgine 150milliGRAM(s) Oral every 12 hours  levETIRAcetam 500milliGRAM(s) Oral every 12 hours    MEDICATIONS  (PRN):      Allergies:  Allergies    No Known Allergies    Intolerances        Labs:                Radiology and other tests:

## 2017-02-12 NOTE — PROGRESS NOTE ADULT - ASSESSMENT
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 So far EEG shows right anterior temporal epileptiform discharges, right anterior temporal slowing, and right anterior temporal rhythmic delta activity (suggestive of right anterior temporal hyperexcitability). 2/9pm she had one electroclinical seizure, right temporal on EEG and characterized as a "weird" feeling in her left hand. Neuropsych has already evaluated the patient and results and pending. We will attempt to capture more seizures by titrating medication dose. 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 So far EEG shows right anterior temporal epileptiform discharges, right anterior temporal slowing, and right anterior temporal rhythmic delta activity (suggestive of right anterior temporal hyperexcitability). 2/9pm she had one electroclinical seizure, right temporal on EEG and characterized as a "weird" feeling in her left hand. Neuropsych has already evaluated the patient and results and pending. She had an event of clumsiness in her fingers 2/11pm without EEG correlate. We will attempt to capture more seizures by tapering meds.

## 2017-02-13 PROCEDURE — 96118: CPT

## 2017-02-13 PROCEDURE — 95951: CPT | Mod: 26

## 2017-02-13 PROCEDURE — 96116 NUBHVL XM PHYS/QHP 1ST HR: CPT

## 2017-02-13 PROCEDURE — 99232 SBSQ HOSP IP/OBS MODERATE 35: CPT

## 2017-02-13 RX ADMIN — LAMOTRIGINE 150 MILLIGRAM(S): 25 TABLET, ORALLY DISINTEGRATING ORAL at 23:15

## 2017-02-13 RX ADMIN — LAMOTRIGINE 150 MILLIGRAM(S): 25 TABLET, ORALLY DISINTEGRATING ORAL at 01:19

## 2017-02-13 RX ADMIN — LEVETIRACETAM 250 MILLIGRAM(S): 250 TABLET, FILM COATED ORAL at 06:40

## 2017-02-13 RX ADMIN — LAMOTRIGINE 150 MILLIGRAM(S): 25 TABLET, ORALLY DISINTEGRATING ORAL at 12:52

## 2017-02-13 NOTE — PROGRESS NOTE ADULT - SUBJECTIVE AND OBJECTIVE BOX
INTERVAL HPI/OVERNIGHT EVENTS:    OVERNIGHT: No overnight events.  SUBJECTIVE: Patient seen and examined at bedside.     ROS:  CV: Denies chest pain  Resp: Denies SOB  GI: Denies abdominal pain, constipation, diarrhea, nausea, vomiting  : Denies dysuria  ID: Denies fevers, chills  MSK: Denies joint pain     OBJECTIVE:    VITAL SIGNS:  ICU Vital Signs Last 24 Hrs  T(C): 36.5, Max: 36.6 (02-12 @ 17:00)  T(F): 97.7, Max: 97.9 (02-12 @ 17:00)  HR: 84 (80 - 96)  BP: 127/62 (112/57 - 136/83)  BP(mean): 78 (78 - 103)  ABP: --  ABP(mean): --  RR: 16 (14 - 17)  SpO2: 99% (96% - 99%)        CAPILLARY BLOOD GLUCOSE      PHYSICAL EXAM:    General: NAD, comfortable  HEENT: NCAT, PERRL, clear conjunctiva, no scleral icterus  Neck: supple, no JVD  Respiratory: CTA b/l, no wheezing, rhonchi, rales  Cardiovascular: RRR, normal S1S2, no M/R/G  Abdomen: soft, NT/ND, bowel sounds in all four quadrants, no palpable masses  Extremities: WWP, no clubbing, cyanosis, or edema  Neuro:     MEDICATIONS:  MEDICATIONS  (STANDING):  heparin  Injectable 7500Unit(s) SubCutaneous every 8 hours  lamoTRIgine 150milliGRAM(s) Oral every 12 hours    MEDICATIONS  (PRN):      ALLERGIES:  Allergies    No Known Allergies    Intolerances        LABS:                RADIOLOGY & ADDITIONAL TESTS: Reviewed. INTERVAL HPI/OVERNIGHT EVENTS:    OVERNIGHT: No overnight events.  SUBJECTIVE: Patient seen and examined at bedside. Endorses she felt "a little funny" yesterday around the evening. also slept earlier than usual at 8 pm and thinks that was abnormal. Denies any headaches or other focal neurologic defcits. Does not endorse having any loss of consciousness however she never truly realizes she is though. ROS documented below     ROS:  CV: Denies chest pain  Resp: Denies SOB  GI: Denies abdominal pain, constipation, diarrhea, nausea, vomiting  : Denies dysuria  ID: Denies fevers, chills  MSK: Denies joint pain     OBJECTIVE:    VITAL SIGNS:  ICU Vital Signs Last 24 Hrs  T(C): 36.5, Max: 36.6 (02-12 @ 17:00)  T(F): 97.7, Max: 97.9 (02-12 @ 17:00)  HR: 84 (80 - 96)  BP: 127/62 (112/57 - 136/83)  BP(mean): 78 (78 - 103)  ABP: --  ABP(mean): --  RR: 16 (14 - 17)  SpO2: 99% (96% - 99%)        CAPILLARY BLOOD GLUCOSE      PHYSICAL EXAM:  General: AO x 3, NAD, Comfortable, Pleasant, Obese  HEENT: PERRL/ EOMI, no scleral icterus, no ptosis, MMM  Respiratory: CTA b/l, no wheezes, rales or rhonchi  Cardiovascular: Regular, +S1 + S2  Abdomen: Soft, Obese, NTND, normoactive bowel sounds, no rebound, no guarding, no suprapubic tenderness  Extremities: No cyanosis, no clubbing, no edema, pulses equal, no calf tenderness  Skin: No rashes  Lymphatic: No cervical/supraclavicular LAD  MEDICATIONS:  MEDICATIONS  (STANDING):  heparin  Injectable 7500Unit(s) SubCutaneous every 8 hours  lamoTRIgine 150milliGRAM(s) Oral every 12 hours    MEDICATIONS  (PRN):      ALLERGIES:  Allergies    No Known Allergies    Intolerances        LABS:                RADIOLOGY & ADDITIONAL TESTS: Reviewed. INTERVAL HPI/OVERNIGHT EVENTS:    OVERNIGHT: No overnight events.  SUBJECTIVE: Patient seen and examined at bedside. Endorses she felt "a little funny" yesterday around the evening. also slept earlier than usual at 8 pm and thinks that was abnormal. Denies any headaches or other focal neurologic defcits. Does not endorse having any loss of consciousness however she never truly realizes she is though. ROS documented below     ROS:  CV: Denies chest pain  Resp: Denies SOB  GI: Denies abdominal pain, constipation, diarrhea, nausea, vomiting  : Denies dysuria  ID: Denies fevers, chills  MSK: Denies joint pain     OBJECTIVE:    VITAL SIGNS:  ICU Vital Signs Last 24 Hrs  T(C): 36.5, Max: 36.6 (02-12 @ 17:00)  T(F): 97.7, Max: 97.9 (02-12 @ 17:00)  HR: 84 (80 - 96)  BP: 127/62 (112/57 - 136/83)  BP(mean): 78 (78 - 103)  ABP: --  ABP(mean): --  RR: 16 (14 - 17)  SpO2: 99% (96% - 99%)        CAPILLARY BLOOD GLUCOSE      PHYSICAL EXAM:  General: AO x 3, NAD, Comfortable, Pleasant, Obese  HEENT: PERRL/ EOMI, no scleral icterus, no ptosis, MMM  Respiratory: CTA b/l, no wheezes, rales or rhonchi  Cardiovascular: Regular, +S1 + S2  Abdomen: Soft, Obese, NTND, normoactive bowel sounds, no rebound, no guarding, no suprapubic tenderness  Extremities: No cyanosis, no clubbing, no edema, pulses equal, no calf tenderness  Skin: No rashes  Lymphatic: No cervical/supraclavicular LAD  MEDICATIONS:  MEDICATIONS  (STANDING):  heparin  Injectable 7500Unit(s) SubCutaneous every 8 hours  lamoTRIgine 150milliGRAM(s) Oral every 12 hours    MEDICATIONS  (PRN):      ALLERGIES:  Allergies    No Known Allergies    Intolerances        LABS: INTERVAL HPI/OVERNIGHT EVENTS:    OVERNIGHT: No overnight events.  SUBJECTIVE: Patient seen and examined at bedside. Endorses she felt "a little funny" yesterday around the evening. also slept earlier than usual at 8 pm and thinks that was abnormal. Denies any headaches or other focal neurologic defcits.  The patient had two electroclinical seizures yesterday of right temporal onset, as described below.     ROS:  CV: Denies chest pain  Resp: Denies SOB  GI: Denies abdominal pain, constipation, diarrhea, nausea, vomiting  : Denies dysuria  ID: Denies fevers, chills  MSK: Denies joint pain     OBJECTIVE:    VITAL SIGNS:  ICU Vital Signs Last 24 Hrs  T(C): 36.5, Max: 36.6 (02-12 @ 17:00)  T(F): 97.7, Max: 97.9 (02-12 @ 17:00)  HR: 84 (80 - 96)  BP: 127/62 (112/57 - 136/83)  BP(mean): 78 (78 - 103)  ABP: --  ABP(mean): --  RR: 16 (14 - 17)  SpO2: 99% (96% - 99%)        CAPILLARY BLOOD GLUCOSE      PHYSICAL EXAM:  General: AO x 3, NAD, Comfortable, Pleasant, Obese  HEENT: PERRL/ EOMI, no scleral icterus, no ptosis, MMM  Respiratory: CTA b/l, no wheezes, rales or rhonchi  Cardiovascular: Regular, +S1 + S2  Abdomen: Soft, Obese, NTND, normoactive bowel sounds, no rebound, no guarding, no suprapubic tenderness  Extremities: No cyanosis, no clubbing, no edema, pulses equal, no calf tenderness  Skin: No rashes  Lymphatic: No cervical/supraclavicular LAD  MEDICATIONS:  MEDICATIONS  (STANDING):  heparin  Injectable 7500Unit(s) SubCutaneous every 8 hours  lamoTRIgine 150milliGRAM(s) Oral every 12 hours    MEDICATIONS  (PRN):      ALLERGIES:  Allergies    No Known Allergies    Intolerances        LABS:

## 2017-02-13 NOTE — PROGRESS NOTE ADULT - PROBLEM SELECTOR PLAN 1
-c/w VEEG-   - C/w Lamictal 150 mg BID  - Keppra discontinued in the morning  - seizure precautions.  - for convulsive seizure >3 minutes, give ativan 2 mg stat and call Dr. Kiel SAEED.

## 2017-02-13 NOTE — PROGRESS NOTE ADULT - ASSESSMENT
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 So far EEG shows right anterior temporal epileptiform discharges, right anterior temporal slowing, and right anterior temporal rhythmic delta activity (suggestive of right anterior temporal hyperexcitability). 2/9pm she had one electroclinical seizure, right temporal on EEG and characterized as a "weird" feeling in her left hand. Neuropsych has already evaluated the patient and results and pending. She had an event of clumsiness in her fingers 2/11pm without EEG correlate. We will attempt to capture more seizures by tapering meds. 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 So far EEG shows right anterior temporal epileptiform discharges, right anterior temporal slowing, and right anterior temporal rhythmic delta activity (suggestive of right anterior temporal hyperexcitability). 2/9pm she had one electroclinical seizure, right temporal on EEG and characterized as a "weird" feeling in her left hand. 2/12 she had two electroclinical seizures, both right tempora onset, during both she was looking at her left hand, and during one of them she had right hand automatism (picking at wires). She pressed the button for both seizures but she has no memory of that, and no memory of the nurse coming in to check on her both times. Neuropsych has already evaluated the patient and results and pending. She had an event of clumsiness in her fingers 2/11pm without EEG correlate. We will attempt to capture more seizures by tapering meds. 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 So far EEG shows right anterior temporal epileptiform discharges, right anterior temporal slowing, and right anterior temporal rhythmic delta activity (suggestive of right anterior temporal hyperexcitability). 2/9pm she had one electroclinical seizure, right temporal on EEG and characterized as a "weird" feeling in her left hand. 2/12 she had two electroclinical seizures, both right tempora onset, during both she was looking at her left hand, and during one of them she had right hand automatism (picking at wires) and saying "no" several times in a row, and during the other she had left hand shaking. She pressed the button for both seizures but she has no memory of that, and no memory of the nurse coming in to check on her both times. Neuropsych has already evaluated the patient and results and pending. She had an event of clumsiness in her fingers 2/11pm without EEG correlate. We will attempt to capture more seizures by tapering meds.

## 2017-02-14 DIAGNOSIS — R51 HEADACHE: ICD-10-CM

## 2017-02-14 PROCEDURE — 95951: CPT | Mod: 26

## 2017-02-14 PROCEDURE — 99232 SBSQ HOSP IP/OBS MODERATE 35: CPT

## 2017-02-14 RX ORDER — ACETAMINOPHEN 500 MG
650 TABLET ORAL EVERY 6 HOURS
Qty: 0 | Refills: 0 | Status: DISCONTINUED | OUTPATIENT
Start: 2017-02-14 | End: 2017-02-21

## 2017-02-14 RX ORDER — ACETAMINOPHEN 500 MG
650 TABLET ORAL ONCE
Qty: 0 | Refills: 0 | Status: COMPLETED | OUTPATIENT
Start: 2017-02-14 | End: 2017-02-14

## 2017-02-14 RX ADMIN — Medication 650 MILLIGRAM(S): at 19:23

## 2017-02-14 RX ADMIN — LAMOTRIGINE 150 MILLIGRAM(S): 25 TABLET, ORALLY DISINTEGRATING ORAL at 12:45

## 2017-02-14 RX ADMIN — Medication 650 MILLIGRAM(S): at 07:44

## 2017-02-14 RX ADMIN — Medication 650 MILLIGRAM(S): at 12:11

## 2017-02-14 RX ADMIN — Medication 650 MILLIGRAM(S): at 17:45

## 2017-02-14 NOTE — PROGRESS NOTE ADULT - ASSESSMENT
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 So far EEG shows right anterior temporal epileptiform discharges, right anterior temporal slowing, and right anterior temporal rhythmic delta activity (suggestive of right anterior temporal hyperexcitability). 2/9pm she had one electroclinical seizure, right temporal on EEG and characterized as a "weird" feeling in her left hand. 2/12 she had two electroclinical seizures, both right tempora onset, during both she was looking at her left hand, and during one of them she had right hand automatism (picking at wires) and saying "no" several times in a row, and during the other she had left hand shaking. She pressed the button for both seizures but she has no memory of that, and no memory of the nurse coming in to check on her both times. Neuropsych has already evaluated the patient and results and pending. She had an event of clumsiness in her fingers 2/11pm without EEG correlate. We will attempt to capture more seizures by tapering meds. 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 So far EEG shows right anterior temporal epileptiform discharges, right anterior temporal slowing, and right anterior temporal rhythmic delta activity (suggestive of right anterior temporal hyperexcitability). 2/9pm she had one electroclinical seizure, right temporal on EEG and characterized as a "weird" feeling in her left hand. 2/12 she had two electroclinical seizures, both right temporal onset, during both she was looking at her left hand, and during one of them she had right hand automatism (picking at wires) and saying "no" several times in a row, and during the other she had left hand shaking. She pressed the button for both seizures but she has no memory of that, and no memory of the nurse coming in to check on her both times. Neuropsych has already evaluated the patient and results and pending. She had an event of clumsiness in her fingers 2/11pm without EEG correlate. We are still waiting for one of her "larger" spells characterized by diminished responsiveness and behavior arrest.

## 2017-02-14 NOTE — PROGRESS NOTE ADULT - PROBLEM SELECTOR PLAN 2
Patient with headache overnight after being kept awake by roomate  - will give tylenol 650 mgx1 Patient with headache overnight after being kept awake by roommate  - will give tylenol 650 mgx1

## 2017-02-14 NOTE — PROGRESS NOTE ADULT - PROBLEM SELECTOR PLAN 1
-c/w VEEG-   - C/w Lamictal 150 mg BID  - currently off of Keppra  - seizure precautions.  - for convulsive seizure >3 minutes, give ativan 2 mg stat and call Dr. Kiel SAEED.

## 2017-02-14 NOTE — PROGRESS NOTE ADULT - SUBJECTIVE AND OBJECTIVE BOX
INTERVAL HPI/OVERNIGHT EVENTS:    OVERNIGHT: Patient could not sleep since patients roommate was active overnight; Patient had a headache   SUBJECTIVE: Patient seen and examined at bedside.     ROS:  CV: Denies chest pain  Resp: Denies SOB  GI: Denies abdominal pain, constipation, diarrhea, nausea, vomiting  : Denies dysuria  ID: Denies fevers, chills      OBJECTIVE:    VITAL SIGNS:  ICU Vital Signs Last 24 Hrs  T(C): 36.8, Max: 37.1 (02-13 @ 18:01)  T(F): 98.2, Max: 98.7 (02-13 @ 18:01)  HR: 80 (76 - 98)  BP: 117/82 (98/56 - 132/73)  BP(mean): 95 (70 - 95)  ABP: --  ABP(mean): --  RR: 18 (16 - 20)  SpO2: 97% (95% - 99%)        I & Os for current day (as of 02-14 @ 14:33)  =============================================  IN: 340 ml / OUT: 840 ml / NET: -500 ml    CAPILLARY BLOOD GLUCOSE      PHYSICAL EXAM:    General: AO x 3, NAD, Comfortable, Obese  HEENT: PERRL/ EOMI, no scleral icterus, no ptosis, MMM  Respiratory: CTA b/l, no wheezes, rales or rhonchi  Cardiovascular: Regular, +S1 + S2  Abdomen: Soft, Obese, NTND, normoactive bowel sounds, no rebound, no guarding, no suprapubic tenderness  Extremities: No cyanosis, no clubbing, no edema, pulses equal, no calf tenderness  Skin: No rashes  Lymphatic: No cervical/supraclavicular LAD    MEDICATIONS:  MEDICATIONS  (STANDING):  heparin  Injectable 7500Unit(s) SubCutaneous every 8 hours  lamoTRIgine 150milliGRAM(s) Oral every 12 hours    MEDICATIONS  (PRN):      ALLERGIES:  Allergies    No Known Allergies    Intolerances        LABS:                RADIOLOGY & ADDITIONAL TESTS: Reviewed.

## 2017-02-15 LAB
ANION GAP SERPL CALC-SCNC: 7 MMOL/L — LOW (ref 9–16)
BUN SERPL-MCNC: 12 MG/DL — SIGNIFICANT CHANGE UP (ref 7–23)
CALCIUM SERPL-MCNC: 8.7 MG/DL — SIGNIFICANT CHANGE UP (ref 8.5–10.5)
CHLORIDE SERPL-SCNC: 106 MMOL/L — SIGNIFICANT CHANGE UP (ref 96–108)
CO2 SERPL-SCNC: 27 MMOL/L — SIGNIFICANT CHANGE UP (ref 22–31)
CREAT SERPL-MCNC: 0.67 MG/DL — SIGNIFICANT CHANGE UP (ref 0.5–1.3)
GLUCOSE SERPL-MCNC: 92 MG/DL — SIGNIFICANT CHANGE UP (ref 70–99)
HCT VFR BLD CALC: 40.8 % — SIGNIFICANT CHANGE UP (ref 34.5–45)
HGB BLD-MCNC: 13.5 G/DL — SIGNIFICANT CHANGE UP (ref 11.5–15.5)
MAGNESIUM SERPL-MCNC: 2.3 MG/DL — SIGNIFICANT CHANGE UP (ref 1.6–2.4)
MCHC RBC-ENTMCNC: 27.4 PG — SIGNIFICANT CHANGE UP (ref 27–34)
MCHC RBC-ENTMCNC: 33.1 G/DL — SIGNIFICANT CHANGE UP (ref 32–36)
MCV RBC AUTO: 82.9 FL — SIGNIFICANT CHANGE UP (ref 80–100)
PLATELET # BLD AUTO: 233 K/UL — SIGNIFICANT CHANGE UP (ref 150–400)
POTASSIUM SERPL-MCNC: 3.9 MMOL/L — SIGNIFICANT CHANGE UP (ref 3.5–5.3)
POTASSIUM SERPL-SCNC: 3.9 MMOL/L — SIGNIFICANT CHANGE UP (ref 3.5–5.3)
RBC # BLD: 4.92 M/UL — SIGNIFICANT CHANGE UP (ref 3.8–5.2)
RBC # FLD: 13.8 % — SIGNIFICANT CHANGE UP (ref 10.3–16.9)
SODIUM SERPL-SCNC: 140 MMOL/L — SIGNIFICANT CHANGE UP (ref 135–145)
WBC # BLD: 7.1 K/UL — SIGNIFICANT CHANGE UP (ref 3.8–10.5)
WBC # FLD AUTO: 7.1 K/UL — SIGNIFICANT CHANGE UP (ref 3.8–10.5)

## 2017-02-15 PROCEDURE — 99232 SBSQ HOSP IP/OBS MODERATE 35: CPT

## 2017-02-15 PROCEDURE — 95951: CPT | Mod: 26

## 2017-02-15 RX ORDER — LAMOTRIGINE 25 MG/1
100 TABLET, ORALLY DISINTEGRATING ORAL EVERY 12 HOURS
Qty: 0 | Refills: 0 | Status: DISCONTINUED | OUTPATIENT
Start: 2017-02-15 | End: 2017-02-16

## 2017-02-15 RX ADMIN — LAMOTRIGINE 150 MILLIGRAM(S): 25 TABLET, ORALLY DISINTEGRATING ORAL at 00:03

## 2017-02-15 RX ADMIN — Medication 650 MILLIGRAM(S): at 06:01

## 2017-02-15 RX ADMIN — Medication 650 MILLIGRAM(S): at 06:57

## 2017-02-15 RX ADMIN — LAMOTRIGINE 100 MILLIGRAM(S): 25 TABLET, ORALLY DISINTEGRATING ORAL at 22:54

## 2017-02-15 RX ADMIN — LAMOTRIGINE 150 MILLIGRAM(S): 25 TABLET, ORALLY DISINTEGRATING ORAL at 11:51

## 2017-02-15 NOTE — PROGRESS NOTE ADULT - ASSESSMENT
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 So far EEG shows right anterior temporal epileptiform discharges, right anterior temporal slowing, and right anterior temporal rhythmic delta activity (suggestive of right anterior temporal hyperexcitability). 2/9pm she had one electroclinical seizure, right temporal on EEG and characterized as a "weird" feeling in her left hand. 2/12 she had two electroclinical seizures, both right temporal onset, during both she was looking at her left hand, and during one of them she had right hand automatism (picking at wires) and saying "no" several times in a row, and during the other she had left hand shaking. She pressed the button for both seizures but she has no memory of that, and no memory of the nurse coming in to check on her both times. Neuropsych has already evaluated the patient and results and pending. She had an event of clumsiness in her fingers 2/11pm without EEG correlate. We are still waiting for one of her "larger" spells characterized by diminished responsiveness and behavior arrest. 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 So far EEG shows right anterior temporal epileptiform discharges, right anterior temporal slowing, and right anterior temporal rhythmic delta activity (suggestive of right anterior temporal hyperexcitability). 2/9pm she had one electroclinical seizure, right temporal on EEG and characterized as a "weird" feeling in her left hand. 2/12 she had two electroclinical seizures, both right temporal onset, during both she was looking at her left hand, and during one of them she had right hand automatism (picking at wires) and saying "no" several times in a row, and during the other she had left hand shaking. She pressed the button for both seizures but she has no memory of that, and no memory of the nurse coming in to check on her both times. Neuropsych has already evaluated the patient and results and pending. She had an event of clumsiness in her fingers 2/11pm without EEG correlate. The patient had an episode of right thumb twitching yesterday with occasional involvement of the other fingers of the right hand, which appeared to be a tremor (no EEG correlate). The movement would stop when she straightened her right thumb, then return when she voluntarily flexed it to 90 degrees. We are still waiting for one of her "larger" spells characterized by diminished responsiveness and behavior arrest. We will go down on her Lamictal dose starting today.

## 2017-02-15 NOTE — PROGRESS NOTE ADULT - PROBLEM SELECTOR PLAN 2
Patient with headaches 2/14 after being kept awake by roommate; no headaches currently   -  tylenol 650 mg PRN

## 2017-02-15 NOTE — PROGRESS NOTE ADULT - SUBJECTIVE AND OBJECTIVE BOX
INTERVAL HPI/OVERNIGHT EVENTS:    OVERNIGHT: No overnight events.  SUBJECTIVE: Patient seen and examined at bedside.     ROS:  CV: Denies chest pain  Resp: Denies SOB  GI: Denies abdominal pain, constipation, diarrhea, nausea, vomiting  : Denies dysuria  ID: Denies fevers, chills  MSK: Denies joint pain     OBJECTIVE:    VITAL SIGNS:  ICU Vital Signs Last 24 Hrs  T(C): 36.6, Max: 36.9 (02-15 @ 04:51)  T(F): 97.9, Max: 98.4 (02-15 @ 04:51)  HR: 98 (82 - 98)  BP: 140/72 (95/53 - 140/72)  BP(mean): 97 (70 - 98)  ABP: --  ABP(mean): --  RR: 16 (16 - 18)  SpO2: 96% (96% - 100%)        CAPILLARY BLOOD GLUCOSE      PHYSICAL EXAM:    General: NAD, comfortable  HEENT: NCAT, PERRL, clear conjunctiva, no scleral icterus  Neck: supple, no JVD  Respiratory: CTA b/l, no wheezing, rhonchi, rales  Cardiovascular: RRR, normal S1S2, no M/R/G  Abdomen: soft, NT/ND, bowel sounds in all four quadrants, no palpable masses  Extremities: WWP, no clubbing, cyanosis, or edema  Neuro:     MEDICATIONS:  MEDICATIONS  (STANDING):  heparin  Injectable 7500Unit(s) SubCutaneous every 8 hours  lamoTRIgine 150milliGRAM(s) Oral every 12 hours    MEDICATIONS  (PRN):  acetaminophen   Tablet. 650milliGRAM(s) Oral every 6 hours PRN Moderate Pain (4 - 6)      ALLERGIES:  Allergies    No Known Allergies    Intolerances        LABS:                        13.5   7.1   )-----------( 233      ( 15 Feb 2017 06:12 )             40.8     15 Feb 2017 06:09    140    |  106    |  12     ----------------------------<  92     3.9     |  27     |  0.67     Ca    8.7        15 Feb 2017 06:09  Mg     2.3       15 Feb 2017 06:09            RADIOLOGY & ADDITIONAL TESTS: Reviewed. INTERVAL HPI/OVERNIGHT EVENTS:    OVERNIGHT: No overnight events.  SUBJECTIVE: Patient seen and examined at bedside. Patient has no acute complaints      OBJECTIVE:    VITAL SIGNS:  ICU Vital Signs Last 24 Hrs  T(C): 36.6, Max: 36.9 (02-15 @ 04:51)  T(F): 97.9, Max: 98.4 (02-15 @ 04:51)  HR: 98 (82 - 98)  BP: 140/72 (95/53 - 140/72)  BP(mean): 97 (70 - 98)  ABP: --  ABP(mean): --  RR: 16 (16 - 18)  SpO2: 96% (96% - 100%)        CAPILLARY BLOOD GLUCOSE      PHYSICAL EXAM:    General: AO x 3, NAD, Comfortable, Obese, EEG leads in place   HEENT: PERRL/ EOMI, no scleral icterus, no ptosis, MMM  Respiratory: CTA b/l, no wheezes, rales or rhonchi  Cardiovascular: Regular, +S1 + S2  Abdomen: Soft, Obese, NTND, normoactive bowel sounds, no rebound, no guarding, no suprapubic tenderness  Extremities: No cyanosis, no clubbing, no edema, pulses equal, no calf tenderness  Skin: No rashes  Lymphatic: No cervical/supraclavicular LAD    MEDICATIONS:  MEDICATIONS  (STANDING):  heparin  Injectable 7500Unit(s) SubCutaneous every 8 hours  lamoTRIgine 150milliGRAM(s) Oral every 12 hours    MEDICATIONS  (PRN):  acetaminophen   Tablet. 650milliGRAM(s) Oral every 6 hours PRN Moderate Pain (4 - 6)      ALLERGIES:  Allergies    No Known Allergies    Intolerances        LABS:                        13.5   7.1   )-----------( 233      ( 15 Feb 2017 06:12 )             40.8     15 Feb 2017 06:09    140    |  106    |  12     ----------------------------<  92     3.9     |  27     |  0.67     Ca    8.7        15 Feb 2017 06:09  Mg     2.3       15 Feb 2017 06:09            RADIOLOGY & ADDITIONAL TESTS: Reviewed. INTERVAL HPI/OVERNIGHT EVENTS:    OVERNIGHT: The patient had an episode of right thumb twitching yesterday with occasional involvement of the other fingers of the right hand, which appeared to be a tremor (no EEG correlate). The movement would stop when she straightened her right thumb, then return when she voluntarily flexed it to 90 degrees.  SUBJECTIVE: Patient seen and examined at bedside. Patient has no acute complaints      OBJECTIVE:    VITAL SIGNS:  ICU Vital Signs Last 24 Hrs  T(C): 36.6, Max: 36.9 (02-15 @ 04:51)  T(F): 97.9, Max: 98.4 (02-15 @ 04:51)  HR: 98 (82 - 98)  BP: 140/72 (95/53 - 140/72)  BP(mean): 97 (70 - 98)  ABP: --  ABP(mean): --  RR: 16 (16 - 18)  SpO2: 96% (96% - 100%)        CAPILLARY BLOOD GLUCOSE      PHYSICAL EXAM:    General: AO x 3, NAD, Comfortable, Obese, EEG leads in place   HEENT: PERRL/ EOMI, no scleral icterus, no ptosis, MMM  Respiratory: CTA b/l, no wheezes, rales or rhonchi  Cardiovascular: Regular, +S1 + S2  Abdomen: Soft, Obese, NTND, normoactive bowel sounds, no rebound, no guarding, no suprapubic tenderness  Extremities: No cyanosis, no clubbing, no edema, pulses equal, no calf tenderness  Skin: No rashes  Lymphatic: No cervical/supraclavicular LAD    MEDICATIONS:  MEDICATIONS  (STANDING):  heparin  Injectable 7500Unit(s) SubCutaneous every 8 hours  lamoTRIgine 150milliGRAM(s) Oral every 12 hours    MEDICATIONS  (PRN):  acetaminophen   Tablet. 650milliGRAM(s) Oral every 6 hours PRN Moderate Pain (4 - 6)      ALLERGIES:  Allergies    No Known Allergies    Intolerances        LABS:                        13.5   7.1   )-----------( 233      ( 15 Feb 2017 06:12 )             40.8     15 Feb 2017 06:09    140    |  106    |  12     ----------------------------<  92     3.9     |  27     |  0.67     Ca    8.7        15 Feb 2017 06:09  Mg     2.3       15 Feb 2017 06:09            RADIOLOGY & ADDITIONAL TESTS: Reviewed.

## 2017-02-15 NOTE — PROGRESS NOTE ADULT - PROBLEM SELECTOR PLAN 1
-c/w VEEG- will conduct a sleep deprived study until 4 am   - will titrate Lamictal to 100 mg BID  - currently off of Keppra  - seizure precautions.  - for convulsive seizure >3 minutes, give ativan 2 mg stat and call Dr. Kiel SAEED.

## 2017-02-16 DIAGNOSIS — R09.89 OTHER SPECIFIED SYMPTOMS AND SIGNS INVOLVING THE CIRCULATORY AND RESPIRATORY SYSTEMS: ICD-10-CM

## 2017-02-16 PROCEDURE — 99232 SBSQ HOSP IP/OBS MODERATE 35: CPT

## 2017-02-16 PROCEDURE — 95951: CPT | Mod: 26

## 2017-02-16 RX ORDER — LAMOTRIGINE 25 MG/1
50 TABLET, ORALLY DISINTEGRATING ORAL EVERY 12 HOURS
Qty: 0 | Refills: 0 | Status: DISCONTINUED | OUTPATIENT
Start: 2017-02-16 | End: 2017-02-19

## 2017-02-16 RX ADMIN — LAMOTRIGINE 100 MILLIGRAM(S): 25 TABLET, ORALLY DISINTEGRATING ORAL at 12:47

## 2017-02-16 NOTE — PROGRESS NOTE ADULT - SUBJECTIVE AND OBJECTIVE BOX
INTERVAL HPI/OVERNIGHT EVENTS:    OVERNIGHT: No overnight events.  SUBJECTIVE: Patient seen and examined at bedside.     ROS:  CV: Denies chest pain  Resp: Denies SOB  GI: Denies abdominal pain, constipation, diarrhea, nausea, vomiting  : Denies dysuria  ID: Denies fevers, chills  MSK: Denies joint pain     OBJECTIVE:    VITAL SIGNS:  ICU Vital Signs Last 24 Hrs  T(C): 36.3, Max: 36.9 (02-15 @ 14:21)  T(F): 97.4, Max: 98.4 (02-15 @ 14:21)  HR: 98 (71 - 112)  BP: 124/72 (111/59 - 140/72)  BP(mean): 90 (81 - 102)  ABP: --  ABP(mean): --  RR: 16 (16 - 18)  SpO2: 98% (91% - 98%)        CAPILLARY BLOOD GLUCOSE      PHYSICAL EXAM:    General: NAD, comfortable  HEENT: NCAT, PERRL, clear conjunctiva, no scleral icterus  Neck: supple, no JVD  Respiratory: CTA b/l, no wheezing, rhonchi, rales  Cardiovascular: RRR, normal S1S2, no M/R/G  Abdomen: soft, NT/ND, bowel sounds in all four quadrants, no palpable masses  Extremities: WWP, no clubbing, cyanosis, or edema  Neuro:     MEDICATIONS:  MEDICATIONS  (STANDING):  heparin  Injectable 7500Unit(s) SubCutaneous every 8 hours  lamoTRIgine 100milliGRAM(s) Oral every 12 hours    MEDICATIONS  (PRN):  acetaminophen   Tablet. 650milliGRAM(s) Oral every 6 hours PRN Moderate Pain (4 - 6)      ALLERGIES:  Allergies    No Known Allergies    Intolerances        LABS:                        13.5   7.1   )-----------( 233      ( 15 Feb 2017 06:12 )             40.8     15 Feb 2017 06:09    140    |  106    |  12     ----------------------------<  92     3.9     |  27     |  0.67     Ca    8.7        15 Feb 2017 06:09  Mg     2.3       15 Feb 2017 06:09            RADIOLOGY & ADDITIONAL TESTS: Reviewed. INTERVAL HPI/OVERNIGHT EVENTS:    OVERNIGHT: patient had an episode at 6 pm yesterday where her left hand was twitching and she didnt answer questions for a couple of seconds (no tongue biting or urinary incontinence);  SUBJECTIVE: Patient seen and examined at bedside. Denies any more episodes where she had twitching; slept well and has no headaches or numbness or tingling     OBJECTIVE:    VITAL SIGNS:  ICU Vital Signs Last 24 Hrs  T(C): 36.3, Max: 36.9 (02-15 @ 14:21)  T(F): 97.4, Max: 98.4 (02-15 @ 14:21)  HR: 98 (71 - 112)  BP: 124/72 (111/59 - 140/72)  BP(mean): 90 (81 - 102)  ABP: --  ABP(mean): --  RR: 16 (16 - 18)  SpO2: 98% (91% - 98%)        CAPILLARY BLOOD GLUCOSE      PHYSICAL EXAM:    General: AO x 3, NAD, Comfortable, Obese, EEG leads in place   HEENT: PERRL/ EOMI, no scleral icterus, no ptosis, MMM  Respiratory: crackles heard at the LLB, no wheezes, rales or rhonchi  Cardiovascular: Regular, +S1 + S2  Abdomen: Soft, Obese, NTND, normoactive bowel sounds, no rebound, no guarding, no suprapubic tenderness  Extremities: No cyanosis, no clubbing, no edema, pulses equal, no calf tenderness  Skin: No rashes  Lymphatic: No cervical/supraclavicular LAD    MEDICATIONS:  MEDICATIONS  (STANDING):  heparin  Injectable 7500Unit(s) SubCutaneous every 8 hours  lamoTRIgine 100milliGRAM(s) Oral every 12 hours    MEDICATIONS  (PRN):  acetaminophen   Tablet. 650milliGRAM(s) Oral every 6 hours PRN Moderate Pain (4 - 6)      ALLERGIES:  Allergies    No Known Allergies    Intolerances        LABS:                        13.5   7.1   )-----------( 233      ( 15 Feb 2017 06:12 )             40.8     15 Feb 2017 06:09    140    |  106    |  12     ----------------------------<  92     3.9     |  27     |  0.67     Ca    8.7        15 Feb 2017 06:09  Mg     2.3       15 Feb 2017 06:09            RADIOLOGY & ADDITIONAL TESTS: Reviewed. INTERVAL HPI/OVERNIGHT EVENTS:    OVERNIGHT: There were two more right anterior temporal onset seizures yesterday. One occurred off camera.   SUBJECTIVE: Patient seen and examined at bedside. Denies any more episodes where she had twitching; slept well and has no headaches or numbness or tingling     OBJECTIVE:    VITAL SIGNS:  ICU Vital Signs Last 24 Hrs  T(C): 36.3, Max: 36.9 (02-15 @ 14:21)  T(F): 97.4, Max: 98.4 (02-15 @ 14:21)  HR: 98 (71 - 112)  BP: 124/72 (111/59 - 140/72)  BP(mean): 90 (81 - 102)  ABP: --  ABP(mean): --  RR: 16 (16 - 18)  SpO2: 98% (91% - 98%)        CAPILLARY BLOOD GLUCOSE      PHYSICAL EXAM:    General: AO x 3, NAD, Comfortable, Obese, EEG leads in place   HEENT: PERRL/ EOMI, no scleral icterus, no ptosis, MMM  Respiratory: crackles heard at the LLB, no wheezes, rales or rhonchi  Cardiovascular: Regular, +S1 + S2  Abdomen: Soft, Obese, NTND, normoactive bowel sounds, no rebound, no guarding, no suprapubic tenderness  Extremities: No cyanosis, no clubbing, no edema, pulses equal, no calf tenderness  Skin: No rashes  Lymphatic: No cervical/supraclavicular LAD    MEDICATIONS:  MEDICATIONS  (STANDING):  heparin  Injectable 7500Unit(s) SubCutaneous every 8 hours  lamoTRIgine 100milliGRAM(s) Oral every 12 hours    MEDICATIONS  (PRN):  acetaminophen   Tablet. 650milliGRAM(s) Oral every 6 hours PRN Moderate Pain (4 - 6)      ALLERGIES:  Allergies    No Known Allergies    Intolerances        LABS:                        13.5   7.1   )-----------( 233      ( 15 Feb 2017 06:12 )             40.8     15 Feb 2017 06:09    140    |  106    |  12     ----------------------------<  92     3.9     |  27     |  0.67     Ca    8.7        15 Feb 2017 06:09  Mg     2.3       15 Feb 2017 06:09            RADIOLOGY & ADDITIONAL TESTS: Reviewed.

## 2017-02-16 NOTE — PROGRESS NOTE ADULT - ASSESSMENT
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 So far EEG shows right anterior temporal epileptiform discharges, right anterior temporal slowing, and right anterior temporal rhythmic delta activity (suggestive of right anterior temporal hyperexcitability). 2/9pm she had one electroclinical seizure, right temporal on EEG and characterized as a "weird" feeling in her left hand. 2/12 she had two electroclinical seizures, both right temporal onset, during both she was looking at her left hand, and during one of them she had right hand automatism (picking at wires) and saying "no" several times in a row, and during the other she had left hand shaking. She pressed the button for both seizures but she has no memory of that, and no memory of the nurse coming in to check on her both times. Neuropsych has already evaluated the patient and results and pending. She had an event of clumsiness in her fingers 2/11pm without EEG correlate. The patient had an episode of right thumb twitching yesterday with occasional involvement of the other fingers of the right hand, which appeared to be a tremor (no EEG correlate). The movement would stop when she straightened her right thumb, then return when she voluntarily flexed it to 90 degrees. We are still waiting for one of her "larger" spells characterized by diminished responsiveness and behavior arrest. We will go down on her Lamictal dose starting today. 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 So far EEG shows right anterior temporal epileptiform discharges, right anterior temporal slowing, and right anterior temporal rhythmic delta activity (suggestive of right anterior temporal hyperexcitability). 2/9pm she had one electroclinical seizure, right temporal on EEG and characterized as a "weird" feeling in her left hand. 2/12 she had two electroclinical seizures, both right temporal onset, during both she was looking at her left hand, and during one of them she had right hand automatism (picking at wires) and saying "no" several times in a row, and during the other she had left hand shaking. She pressed the button for both seizures but she has no memory of that, and no memory of the nurse coming in to check on her both times. She had two right anterior temporal onset seizures 2/15. For the first one she was off camera, and for the second she looked at her left hand. For all seizures so far captured on video, she had ictal water drinking. For one seizure she had a postictal nose wipe with the right hand. Neuropsych has already evaluated the patient and results and pending. She had an event of clumsiness in her fingers 2/11pm without EEG correlate. The patient had an episode of right thumb twitching 2/14 with occasional involvement of the other fingers of the right hand, which appeared to be a tremor (no EEG correlate). The movement would stop when she straightened her right thumb, then return when she voluntarily flexed it to 90 degrees. We are still waiting for one of her "larger" spells characterized by diminished responsiveness and behavior arrest.

## 2017-02-16 NOTE — PROGRESS NOTE ADULT - PROBLEM SELECTOR PLAN 1
-c/w VEEG-  - c/w Lamictal  100 mg BID  - currently off of Keppra  - seizure precautions.  - for convulsive seizure >3 minutes, give ativan 2 mg stat and call Dr. Kiel SAEED. -c/w VEEG-  - decrease Lamictal to 50 mg bid  - currently off of Keppra  - seizure precautions.  - for convulsive seizure >3 minutes, give ativan 2 mg stat and call Dr. Kiel SAEED.

## 2017-02-17 ENCOUNTER — OTHER (OUTPATIENT)
Age: 32
End: 2017-02-17

## 2017-02-17 PROCEDURE — 95951: CPT | Mod: 26

## 2017-02-17 PROCEDURE — 99233 SBSQ HOSP IP/OBS HIGH 50: CPT

## 2017-02-17 RX ADMIN — LAMOTRIGINE 50 MILLIGRAM(S): 25 TABLET, ORALLY DISINTEGRATING ORAL at 23:10

## 2017-02-17 RX ADMIN — LAMOTRIGINE 50 MILLIGRAM(S): 25 TABLET, ORALLY DISINTEGRATING ORAL at 00:47

## 2017-02-17 RX ADMIN — LAMOTRIGINE 50 MILLIGRAM(S): 25 TABLET, ORALLY DISINTEGRATING ORAL at 11:09

## 2017-02-17 NOTE — PROGRESS NOTE ADULT - ATTENDING COMMENTS
I modified the note above with the exception of the physical exam section. The exam below is my own:    Eyes open spontaneously. Conversational with appropriate sentences.  EOMI. Visual fields full. PERRL 3>2. Face symmetrical.  Full strength throughout.  Finger-nose-finger intact R/L.  Intact to light touch throughout.
I modified the note above with the exception of the physical exam section. The exam below is my own:    Eyes open spontaneously. Conversational with appropriate sentences.  EOMI. Visual fields full. PERRL 3>2. Face symmetrical.  Full strength throughout.  Finger-nose-finger intact R/L.  Intact to light touch throughout.
I modified the note above with the exception of the physical exam section. My exam is below:    Eyes open spontaneously. Conversational with appropriate sentences.  EOMI. Visual fields full. PERRL 3>2. Face symmetrical.  Full strength throughout.  Finger-nose-finger intact R/L.  Intact to light touch throughout.
I modified the note above with the exception of the physical exam section. The exam below is my own:    Eyes open spontaneously. Conversational with appropriate sentences.  EOMI. Visual fields full. PERRL 3>2. Face symmetrical.  Full strength throughout.  Finger-nose-finger intact R/L.  Intact to light touch throughout.
I modified the note above with the exception of the physical exam section. The exam below is my own:    Eyes open spontaneously. Conversational with appropriate sentences.  EOMI. Visual fields full. PERRL 3>2. Face symmetrical.  Full strength throughout.  Finger-nose-finger intact R/L.  Intact to light touch throughout..
I modified the note above with the exception of the physical exam section. The exam below is my own:    Eyes open spontaneously. Conversational with appropriate sentences.  EOMI. Visual fields full. PERRL 3>2. Face symmetrical.  Full strength throughout.  Finger-nose-finger intact R/L.  Intact to light touch throughout.

## 2017-02-17 NOTE — PROGRESS NOTE ADULT - ASSESSMENT
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 So far EEG shows right anterior temporal epileptiform discharges, right anterior temporal slowing, and right anterior temporal rhythmic delta activity (suggestive of right anterior temporal hyperexcitability). 2/9pm she had one electroclinical seizure, right temporal on EEG and characterized as a "weird" feeling in her left hand. 2/12 she had two electroclinical seizures, both right temporal onset, during both she was looking at her left hand, and during one of them she had right hand automatism (picking at wires) and saying "no" several times in a row, and during the other she had left hand shaking. She pressed the button for both seizures but she has no memory of that, and no memory of the nurse coming in to check on her both times. She had two right anterior temporal onset seizures 2/15. For the first one she was off camera, and for the second she looked at her left hand. For all seizures so far captured on video, she had ictal water drinking. For one seizure she had a postictal nose wipe with the right hand. Neuropsych has already evaluated the patient and results and pending. She had an event of clumsiness in her fingers 2/11pm without EEG correlate. The patient had an episode of right thumb twitching 2/14 with occasional involvement of the other fingers of the right hand, which appeared to be a tremor (no EEG correlate). The movement would stop when she straightened her right thumb, then return when she voluntarily flexed it to 90 degrees. We are still waiting for one of her "larger" spells characterized by diminished responsiveness and behavior arrest. 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 So far EEG shows right anterior temporal epileptiform discharges, right anterior temporal slowing, and right anterior temporal rhythmic delta activity (suggestive of right anterior temporal hyperexcitability). 2/9pm she had one electroclinical seizure, right temporal on EEG and characterized as a "weird" feeling in her left hand. 2/12 she had two electroclinical seizures, both right temporal onset, during both she was looking at her left hand, and during one of them she had right hand automatism (picking at wires) and saying "no" several times in a row, and during the other she had left hand shaking. She pressed the button for both seizures but she has no memory of that, and no memory of the nurse coming in to check on her both times. She had two right anterior temporal onset seizures 2/15. For the first one she was off camera, and for the second she looked at her left hand. She had one right anterior temporal onset seizure 2/16 (looked at left hand, felt "weird"). For most seizures so far captured on video, she had ictal water drinking. For one seizure she had a postictal nose wipe with the right hand, for one she had subtle wavering speech while saying one word, and during that seizure had subtle difficulty naming objects on a card. She had an event of clumsiness in her fingers 2/11pm without EEG correlate. The patient had an episode of right thumb twitching 2/14 with occasional involvement of the other fingers of the right hand, which appeared to be a tremor (no EEG correlate). The movement would stop when she straightened her right thumb, then return when she voluntarily flexed it to 90 degrees. Neuropsych evaluation during this admission points to nondominant temporal dysfunction. Semiology is suggestive of right temporal vs right insular ictal onset. We discussed the patient in multidisciplinary epilepsy conference today, and the plan is to arrange for WADA testing for further language/memory lateralization.    We are still waiting for one of her "larger" spells characterized by diminished responsiveness and behavior arrest.

## 2017-02-17 NOTE — PROGRESS NOTE ADULT - SUBJECTIVE AND OBJECTIVE BOX
INTERVAL HPI/OVERNIGHT EVENTS:    OVERNIGHT: No overnight events.  SUBJECTIVE: Patient seen and examined at bedside. Denies any more episodes where she had twitching; slept well and has no headaches or numbness or tingling       ROS:  Neuro Denies headaches or numbness  CV: Denies chest pain  Resp: Denies SOB  GI: Denies abdominal pain, constipation, diarrhea, nausea, vomiting  : Denies dysuria  ID: Denies fevers, chills       OBJECTIVE:    VITAL SIGNS:  ICU Vital Signs Last 24 Hrs  T(C): 36.8, Max: 36.8 (02-17 @ 13:53)  T(F): 98.3, Max: 98.3 (02-17 @ 13:53)  HR: 92 (64 - 92)  BP: 128/82 (103/57 - 134/67)  BP(mean): 78 (77 - 92)  ABP: --  ABP(mean): --  RR: 16 (16 - 18)  SpO2: 98% (96% - 98%)        I & Os for current day (as of 02-17 @ 15:56)  =============================================  IN: 400 ml / OUT: 0 ml / NET: 400 ml    CAPILLARY BLOOD GLUCOSE      PHYSICAL EXAM:    General: AO x 3, NAD, Comfortable, Obese, EEG leads in place   HEENT: PERRL/ EOMI, no scleral icterus, no ptosis, MMM  Respiratory: crackles heard at the LLB, no wheezes, rales or rhonchi  Cardiovascular: Regular, +S1 + S2  Abdomen: Soft, Obese, NTND, normoactive bowel sounds, no rebound, no guarding, no suprapubic tenderness  Extremities: No cyanosis, no clubbing, no edema, pulses equal, no calf tenderness  Skin: No rashes  Lymphatic: No cervical/supraclavicular LAD      MEDICATIONS:  MEDICATIONS  (STANDING):  heparin  Injectable 7500Unit(s) SubCutaneous every 8 hours  lamoTRIgine 50milliGRAM(s) Oral every 12 hours    MEDICATIONS  (PRN):  acetaminophen   Tablet. 650milliGRAM(s) Oral every 6 hours PRN Moderate Pain (4 - 6)      ALLERGIES:  Allergies    No Known Allergies    Intolerances        LABS:                RADIOLOGY & ADDITIONAL TESTS: Reviewed. INTERVAL HPI/OVERNIGHT EVENTS:    OVERNIGHT: The patient had another right anterior temporal seizure yesterday as described below.   SUBJECTIVE: Patient seen and examined at bedside. Denies any more episodes where she had twitching; slept well and has no headaches or numbness or tingling       ROS:  Neuro Denies headaches or numbness  CV: Denies chest pain  Resp: Denies SOB  GI: Denies abdominal pain, constipation, diarrhea, nausea, vomiting  : Denies dysuria  ID: Denies fevers, chills       OBJECTIVE:    VITAL SIGNS:  ICU Vital Signs Last 24 Hrs  T(C): 36.8, Max: 36.8 (02-17 @ 13:53)  T(F): 98.3, Max: 98.3 (02-17 @ 13:53)  HR: 92 (64 - 92)  BP: 128/82 (103/57 - 134/67)  BP(mean): 78 (77 - 92)  ABP: --  ABP(mean): --  RR: 16 (16 - 18)  SpO2: 98% (96% - 98%)        I & Os for current day (as of 02-17 @ 15:56)  =============================================  IN: 400 ml / OUT: 0 ml / NET: 400 ml    CAPILLARY BLOOD GLUCOSE      PHYSICAL EXAM:    General: AO x 3, NAD, Comfortable, Obese, EEG leads in place   HEENT: PERRL/ EOMI, no scleral icterus, no ptosis, MMM  Respiratory: crackles heard at the LLB, no wheezes, rales or rhonchi  Cardiovascular: Regular, +S1 + S2  Abdomen: Soft, Obese, NTND, normoactive bowel sounds, no rebound, no guarding, no suprapubic tenderness  Extremities: No cyanosis, no clubbing, no edema, pulses equal, no calf tenderness  Skin: No rashes  Lymphatic: No cervical/supraclavicular LAD      MEDICATIONS:  MEDICATIONS  (STANDING):  heparin  Injectable 7500Unit(s) SubCutaneous every 8 hours  lamoTRIgine 50milliGRAM(s) Oral every 12 hours    MEDICATIONS  (PRN):  acetaminophen   Tablet. 650milliGRAM(s) Oral every 6 hours PRN Moderate Pain (4 - 6)      ALLERGIES:  Allergies    No Known Allergies    Intolerances        LABS:                RADIOLOGY & ADDITIONAL TESTS: Reviewed.

## 2017-02-17 NOTE — PROGRESS NOTE ADULT - PROBLEM SELECTOR PLAN 1
-c/w VEEG-  - c/w Lamictal to 50 mg bid  - currently off of Keppra  - seizure precautions.  - for convulsive seizure >3 minutes, give ativan 2 mg stat and call Dr. Kiel SAEED. -c/w VEEG-  - c/w Lamictal to 50 mg bid  - currently off of Keppra  - seizure precautions.  - for convulsive seizure >3 minutes, give ativan 2 mg stat and call Dr. Kiel SAEED.  - epilepsy surgeon planning on assessing the patient Monday  - at the time of discharge will plan on continuing Lamictal, and replacing Keppra with Topamax.

## 2017-02-18 LAB
ANION GAP SERPL CALC-SCNC: 9 MMOL/L — SIGNIFICANT CHANGE UP (ref 9–16)
BUN SERPL-MCNC: 15 MG/DL — SIGNIFICANT CHANGE UP (ref 7–23)
CALCIUM SERPL-MCNC: 8.9 MG/DL — SIGNIFICANT CHANGE UP (ref 8.5–10.5)
CHLORIDE SERPL-SCNC: 104 MMOL/L — SIGNIFICANT CHANGE UP (ref 96–108)
CO2 SERPL-SCNC: 25 MMOL/L — SIGNIFICANT CHANGE UP (ref 22–31)
CREAT SERPL-MCNC: 0.66 MG/DL — SIGNIFICANT CHANGE UP (ref 0.5–1.3)
GLUCOSE SERPL-MCNC: 90 MG/DL — SIGNIFICANT CHANGE UP (ref 70–99)
HCT VFR BLD CALC: 38.9 % — SIGNIFICANT CHANGE UP (ref 34.5–45)
HGB BLD-MCNC: 13.3 G/DL — SIGNIFICANT CHANGE UP (ref 11.5–15.5)
MAGNESIUM SERPL-MCNC: 2.4 MG/DL — SIGNIFICANT CHANGE UP (ref 1.6–2.4)
MCHC RBC-ENTMCNC: 27.8 PG — SIGNIFICANT CHANGE UP (ref 27–34)
MCHC RBC-ENTMCNC: 34.2 G/DL — SIGNIFICANT CHANGE UP (ref 32–36)
MCV RBC AUTO: 81.4 FL — SIGNIFICANT CHANGE UP (ref 80–100)
PHOSPHATE SERPL-MCNC: 3.3 MG/DL — SIGNIFICANT CHANGE UP (ref 2.5–4.5)
PLATELET # BLD AUTO: 238 K/UL — SIGNIFICANT CHANGE UP (ref 150–400)
POTASSIUM SERPL-MCNC: 3.7 MMOL/L — SIGNIFICANT CHANGE UP (ref 3.5–5.3)
POTASSIUM SERPL-SCNC: 3.7 MMOL/L — SIGNIFICANT CHANGE UP (ref 3.5–5.3)
RBC # BLD: 4.78 M/UL — SIGNIFICANT CHANGE UP (ref 3.8–5.2)
RBC # FLD: 13.7 % — SIGNIFICANT CHANGE UP (ref 10.3–16.9)
SODIUM SERPL-SCNC: 138 MMOL/L — SIGNIFICANT CHANGE UP (ref 135–145)
WBC # BLD: 7.2 K/UL — SIGNIFICANT CHANGE UP (ref 3.8–10.5)
WBC # FLD AUTO: 7.2 K/UL — SIGNIFICANT CHANGE UP (ref 3.8–10.5)

## 2017-02-18 PROCEDURE — 95951: CPT | Mod: 26

## 2017-02-18 PROCEDURE — 99232 SBSQ HOSP IP/OBS MODERATE 35: CPT

## 2017-02-18 RX ADMIN — LAMOTRIGINE 50 MILLIGRAM(S): 25 TABLET, ORALLY DISINTEGRATING ORAL at 22:32

## 2017-02-18 RX ADMIN — LAMOTRIGINE 50 MILLIGRAM(S): 25 TABLET, ORALLY DISINTEGRATING ORAL at 11:31

## 2017-02-18 NOTE — PROGRESS NOTE ADULT - ASSESSMENT
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 So far EEG shows right anterior temporal epileptiform discharges, right anterior temporal slowing, and right anterior temporal rhythmic delta activity (suggestive of right anterior temporal hyperexcitability). 2/9pm she had one electroclinical seizure, right temporal on EEG and characterized as a "weird" feeling in her left hand. 2/12 she had two electroclinical seizures, both right temporal onset, during both she was looking at her left hand, and during one of them she had right hand automatism (picking at wires) and saying "no" several times in a row, and during the other she had left hand shaking. She pressed the button for both seizures but she has no memory of that, and no memory of the nurse coming in to check on her both times. She had two right anterior temporal onset seizures 2/15. For the first one she was off camera, and for the second she looked at her left hand. She had one right anterior temporal onset seizure 2/16 (looked at left hand, felt "weird"). For most seizures so far captured on video, she had ictal water drinking. For one seizure she had a postictal nose wipe with the right hand, for one she had subtle wavering speech while saying one word, and during that seizure had subtle difficulty naming objects on a card. She had an event of clumsiness in her fingers 2/11pm without EEG correlate. The patient had an episode of right thumb twitching 2/14 with occasional involvement of the other fingers of the right hand, which appeared to be a tremor (no EEG correlate). The movement would stop when she straightened her right thumb, then return when she voluntarily flexed it to 90 degrees. Neuropsych evaluation during this admission points to nondominant temporal dysfunction. Semiology is suggestive of right temporal vs right insular ictal onset. We discussed the patient in multidisciplinary epilepsy conference today, and the plan is to arrange for WADA testing for further language/memory lateralization.    We are still waiting for one of her "larger" spells characterized by diminished responsiveness and behavior arrest. 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 So far EEG shows right anterior temporal epileptiform discharges, right anterior temporal slowing, and right anterior temporal rhythmic delta activity (suggestive of right anterior temporal hyperexcitability). 2/9pm she had one electroclinical seizure, right temporal on EEG and characterized as a "weird" feeling in her left hand. 2/12 she had two electroclinical seizures, both right temporal onset, during both she was looking at her left hand, and during one of them she had right hand automatism (picking at wires) and saying "no" several times in a row, and during the other she had left hand shaking. She pressed the button for both seizures but she has no memory of that, and no memory of the nurse coming in to check on her both times. She had two right anterior temporal onset seizures 2/15. For the first one she was off camera, and for the second she looked at her left hand. She had one right anterior temporal onset seizure 2/16 (looked at left hand, felt "weird"). For most seizures so far captured on video, she had ictal water drinking. For one seizure she had a postictal nose wipe with the right hand, for one she had subtle wavering speech while saying one word, and during that seizure had subtle difficulty naming objects on a card. She had an event of clumsiness in her fingers 2/11pm without EEG correlate. The patient had an episode of right thumb twitching 2/14 with occasional involvement of the other fingers of the right hand, which appeared to be a tremor (no EEG correlate). The movement would stop when she straightened her right thumb, then return when she voluntarily flexed it to 90 degrees. Neuropsych evaluation during this admission points to nondominant temporal dysfunction. Semiology is suggestive of right temporal vs right insular ictal onset. We discussed the patient in multidisciplinary epilepsy conference  on 2/17, and the plan is to arrange for WADA testing for further language/memory lateralization.    We are still waiting for one of her "larger" spells characterized by diminished responsiveness and behavior arrest.

## 2017-02-18 NOTE — PROGRESS NOTE ADULT - PROBLEM SELECTOR PLAN 1
-c/w VEEG-  - c/w Lamictal to 50 mg bid  - currently off of Keppra  - seizure precautions.  - for convulsive seizure >3 minutes, give ativan 2 mg stat and call Dr. Kiel SAEED.  - epilepsy surgeon planning on assessing the patient Monday  - at the time of discharge will plan on continuing Lamictal, and replacing Keppra with Topamax. -c/w VEEG-  - c/w Lamictal to 50 mg bid  - currently off of Keppra  - seizure precautions.  - for convulsive seizure >3 minutes, give ativan 2 mg stat and call epilepsy attending.  - epilepsy surgeon planning on assessing the patient Monday  - at the time of discharge will plan on resuming home dose Lamictal, and replacing Keppra with Topamax, tentative discharge on Tuesday

## 2017-02-19 PROCEDURE — 99232 SBSQ HOSP IP/OBS MODERATE 35: CPT

## 2017-02-19 PROCEDURE — 95951: CPT | Mod: 26

## 2017-02-19 RX ORDER — LAMOTRIGINE 25 MG/1
50 TABLET, ORALLY DISINTEGRATING ORAL ONCE
Qty: 0 | Refills: 0 | Status: COMPLETED | OUTPATIENT
Start: 2017-02-19 | End: 2017-02-19

## 2017-02-19 RX ORDER — LAMOTRIGINE 25 MG/1
150 TABLET, ORALLY DISINTEGRATING ORAL
Qty: 0 | Refills: 0 | Status: DISCONTINUED | OUTPATIENT
Start: 2017-02-20 | End: 2017-02-21

## 2017-02-19 RX ORDER — TOPIRAMATE 25 MG
50 TABLET ORAL
Qty: 0 | Refills: 0 | Status: DISCONTINUED | OUTPATIENT
Start: 2017-02-20 | End: 2017-02-21

## 2017-02-19 RX ADMIN — LAMOTRIGINE 50 MILLIGRAM(S): 25 TABLET, ORALLY DISINTEGRATING ORAL at 11:39

## 2017-02-19 NOTE — PROGRESS NOTE ADULT - PROBLEM SELECTOR PLAN 1
-c/w VEEG-  - c/w Lamictal to 50 mg bid  - currently off of Keppra  - seizure precautions.  - for convulsive seizure >3 minutes, give ativan 2 mg stat and call epilepsy attending.  - epilepsy surgeon planning on assessing the patient Monday  - at the time of discharge will plan on resuming home dose Lamictal, and replacing Keppra with Topamax, tentative discharge on Tuesday -c/w VEEG  - c/w Lamictal to 50 mg bid today. Tomorrow AM, resume home Lamictal dose of 150mg PO BID  - currently off of Keppra and will remain off this medication  - Tomorrow AM, start Topamax 50mg PO BID. This will replace Keppra for improved seizure control. Will titrate this medication further as outpatient.  - seizure precautions.  - for convulsive seizure >3 minutes, give ativan 2 mg stat and call epilepsy attending.  - epilepsy surgeon planning on assessing the patient Monday

## 2017-02-19 NOTE — PROGRESS NOTE ADULT - PROBLEM SELECTOR PLAN 8
HSQ + SCDs  Replete lytes as necessary  Dispo: 7 lach monitoring due to hx of WPW; will likely be discharged tuesday  FULL CODE HSQ + SCDs  Replete lytes as necessary  Dispo: 7 lach monitoring due to hx of WPW; will likely be discharged Tuesday  FULL CODE

## 2017-02-19 NOTE — PROGRESS NOTE ADULT - SUBJECTIVE AND OBJECTIVE BOX
INTERVAL HPI/OVERNIGHT EVENTS:    OVERNIGHT: No overnight events.  SUBJECTIVE: Patient seen and examined at bedside.     ROS:  CV: Denies chest pain  Resp: Denies SOB  GI: Denies abdominal pain, constipation, diarrhea, nausea, vomiting  : Denies dysuria  ID: Denies fevers, chills  MSK: Denies joint pain     OBJECTIVE:    VITAL SIGNS:  ICU Vital Signs Last 24 Hrs  T(C): 36.8, Max: 36.9 (02-18 @ 21:55)  T(F): 98.3, Max: 98.4 (02-18 @ 21:55)  HR: 84 (84 - 94)  BP: 105/55 (105/55 - 127/74)  BP(mean): 79 (79 - 98)  ABP: --  ABP(mean): --  RR: 18 (16 - 18)  SpO2: 95% (95% - 98%)      I & Os for 24h ending 02-18 @ 07:00  =============================================  IN: 720 ml / OUT: 0 ml / NET: 720 ml    I & Os for current day (as of 02-19 @ 06:19)  =============================================  IN: 550 ml / OUT: 0 ml / NET: 550 ml    CAPILLARY BLOOD GLUCOSE      PHYSICAL EXAM:    General: NAD, comfortable  HEENT: NCAT, PERRL, clear conjunctiva, no scleral icterus  Neck: supple, no JVD  Respiratory: CTA b/l, no wheezing, rhonchi, rales  Cardiovascular: RRR, normal S1S2, no M/R/G  Abdomen: soft, NT/ND, bowel sounds in all four quadrants, no palpable masses  Extremities: WWP, no clubbing, cyanosis, or edema  Neuro:     MEDICATIONS:  MEDICATIONS  (STANDING):  heparin  Injectable 7500Unit(s) SubCutaneous every 8 hours  lamoTRIgine 50milliGRAM(s) Oral every 12 hours    MEDICATIONS  (PRN):  acetaminophen   Tablet. 650milliGRAM(s) Oral every 6 hours PRN Moderate Pain (4 - 6)      ALLERGIES:  Allergies    No Known Allergies    Intolerances        LABS:                        13.3   7.2   )-----------( 238      ( 18 Feb 2017 06:27 )             38.9     18 Feb 2017 06:27    138    |  104    |  15     ----------------------------<  90     3.7     |  25     |  0.66     Ca    8.9        18 Feb 2017 06:27  Phos  3.3       18 Feb 2017 06:27  Mg     2.4       18 Feb 2017 06:27            RADIOLOGY & ADDITIONAL TESTS: Reviewed. INTERVAL HPI/OVERNIGHT EVENTS:    OVERNIGHT: No overnight events.   SUBJECTIVE: Patient seen and examined at bedside. Denies any headaches or neurologic deficits     ROS:  CV: Denies chest pain  Resp: Denies SOB  GI: Denies abdominal pain, constipation, diarrhea, nausea, vomiting  : Denies dysuria  ID: Denies fevers, chills      OBJECTIVE:    VITAL SIGNS:  ICU Vital Signs Last 24 Hrs  T(C): 36.8, Max: 36.9 (02-18 @ 21:55)  T(F): 98.3, Max: 98.4 (02-18 @ 21:55)  HR: 84 (84 - 94)  BP: 105/55 (105/55 - 127/74)  BP(mean): 79 (79 - 98)  ABP: --  ABP(mean): --  RR: 18 (16 - 18)  SpO2: 95% (95% - 98%)      I & Os for 24h ending 02-18 @ 07:00  =============================================  IN: 720 ml / OUT: 0 ml / NET: 720 ml    I & Os for current day (as of 02-19 @ 06:19)  =============================================  IN: 550 ml / OUT: 0 ml / NET: 550 ml    CAPILLARY BLOOD GLUCOSE      PHYSICAL EXAM:    General: NAD, comfortable, obese, pleasant  HEENT: NCAT, PERRL, clear conjunctiva, no scleral icterus  Neck: supple, no JVD  Respiratory: CTA b/l, no wheezing, rhonchi, rales  Cardiovascular: RRR, normal S1S2, no M/R/G  Abdomen: soft, NT/ND, bowel sounds in all four quadrants, no palpable masses  Extremities: WWP, no clubbing, cyanosis, or edema  Neuro: AAOX3,     MEDICATIONS:  MEDICATIONS  (STANDING):  heparin  Injectable 7500Unit(s) SubCutaneous every 8 hours  lamoTRIgine 50milliGRAM(s) Oral every 12 hours    MEDICATIONS  (PRN):  acetaminophen   Tablet. 650milliGRAM(s) Oral every 6 hours PRN Moderate Pain (4 - 6)      ALLERGIES:  Allergies    No Known Allergies    Intolerances        LABS:                        13.3   7.2   )-----------( 238      ( 18 Feb 2017 06:27 )             38.9     18 Feb 2017 06:27    138    |  104    |  15     ----------------------------<  90     3.7     |  25     |  0.66     Ca    8.9        18 Feb 2017 06:27  Phos  3.3       18 Feb 2017 06:27  Mg     2.4       18 Feb 2017 06:27            RADIOLOGY & ADDITIONAL TESTS: Reviewed. INTERVAL HPI/OVERNIGHT EVENTS:    OVERNIGHT: No overnight events.   SUBJECTIVE: Patient seen and examined at bedside. Denies any headaches or neurologic deficits     ROS:  CV: Denies chest pain  Resp: Denies SOB  GI: Denies abdominal pain, constipation, diarrhea, nausea, vomiting  : Denies dysuria  ID: Denies fevers, chills      OBJECTIVE:    VITAL SIGNS:  ICU Vital Signs Last 24 Hrs  T(C): 36.8, Max: 36.9 (02-18 @ 21:55)  T(F): 98.3, Max: 98.4 (02-18 @ 21:55)  HR: 84 (84 - 94)  BP: 105/55 (105/55 - 127/74)  BP(mean): 79 (79 - 98)  ABP: --  ABP(mean): --  RR: 18 (16 - 18)  SpO2: 95% (95% - 98%)      I & Os for 24h ending 02-18 @ 07:00  =============================================  IN: 720 ml / OUT: 0 ml / NET: 720 ml    I & Os for current day (as of 02-19 @ 06:19)  =============================================  IN: 550 ml / OUT: 0 ml / NET: 550 ml    CAPILLARY BLOOD GLUCOSE      PHYSICAL EXAM:    General: NAD, comfortable, obese, pleasant  HEENT: NCAT, PERRL, clear conjunctiva, no scleral icterus  Neck: supple, no JVD  Respiratory: CTA b/l, no wheezing, rhonchi, rales  Cardiovascular: RRR, normal S1S2, no M/R/G  Abdomen: soft, NT/ND, bowel sounds in all four quadrants, no palpable masses  Extremities: WWP, no clubbing, cyanosis, or edema  Neuro: AAOX3,     MEDICATIONS:  MEDICATIONS  (STANDING):  heparin  Injectable 7500Unit(s) SubCutaneous every 8 hours  lamoTRIgine 50milliGRAM(s) Oral every 12 hours    MEDICATIONS  (PRN):  acetaminophen   Tablet. 650milliGRAM(s) Oral every 6 hours PRN Moderate Pain (4 - 6)      ALLERGIES:  Allergies    No Known Allergies    Intolerances        LABS:                        13.3   7.2   )-----------( 238      ( 18 Feb 2017 06:27 )             38.9     18 Feb 2017 06:27    138    |  104    |  15     ----------------------------<  90     3.7     |  25     |  0.66     Ca    8.9        18 Feb 2017 06:27  Phos  3.3       18 Feb 2017 06:27  Mg     2.4       18 Feb 2017 06:27            . Pt seen and examined at bedside at 12:15pm.    INTERVAL HPI/OVERNIGHT EVENTS:    OVERNIGHT: No overnight events.   SUBJECTIVE: Patient seen and examined at bedside. Denies any headaches or neurologic deficits, no seizures, no fever. All other ROS are negative.     ROS:  CV: Denies chest pain  Resp: Denies SOB  GI: Denies abdominal pain, constipation, diarrhea, nausea, vomiting  : Denies dysuria  ID: Denies fevers, chills      OBJECTIVE:    VITAL SIGNS:  ICU Vital Signs Last 24 Hrs  T(C): 36.8, Max: 36.9 (02-18 @ 21:55)  T(F): 98.3, Max: 98.4 (02-18 @ 21:55)  HR: 84 (84 - 94)  BP: 105/55 (105/55 - 127/74)  BP(mean): 79 (79 - 98)  ABP: --  ABP(mean): --  RR: 18 (16 - 18)  SpO2: 95% (95% - 98%)      I & Os for 24h ending 02-18 @ 07:00  =============================================  IN: 720 ml / OUT: 0 ml / NET: 720 ml    I & Os for current day (as of 02-19 @ 06:19)  =============================================  IN: 550 ml / OUT: 0 ml / NET: 550 ml    CAPILLARY BLOOD GLUCOSE      PHYSICAL EXAM:    General: NAD, comfortable, obese, pleasant  HEENT: NCAT, PERRL, clear conjunctiva, no scleral icterus  Neck: supple, no JVD  Respiratory: CTA b/l, no wheezing, rhonchi, rales  Cardiovascular: RRR, normal S1S2, no M/R/G  Abdomen: soft, NT/ND, bowel sounds in all four quadrants, no palpable masses  Extremities: WWP, no clubbing, cyanosis, or edema  Neuro: AAOX3,     MEDICATIONS:  MEDICATIONS  (STANDING):  heparin  Injectable 7500Unit(s) SubCutaneous every 8 hours  lamoTRIgine 50milliGRAM(s) Oral every 12 hours    MEDICATIONS  (PRN):  acetaminophen   Tablet. 650milliGRAM(s) Oral every 6 hours PRN Moderate Pain (4 - 6)      ALLERGIES:  Allergies    No Known Allergies    Intolerances        LABS:                        13.3   7.2   )-----------( 238      ( 18 Feb 2017 06:27 )             38.9     18 Feb 2017 06:27    138    |  104    |  15     ----------------------------<  90     3.7     |  25     |  0.66     Ca    8.9        18 Feb 2017 06:27  Phos  3.3       18 Feb 2017 06:27  Mg     2.4       18 Feb 2017 06:27      VEEG: Right anterior temporal epileptiform discharges in sleep. No seizures or clinical events captured in last 24 hrs.      . Pt seen and examined at bedside at 12:15pm.    INTERVAL HPI/OVERNIGHT EVENTS:    OVERNIGHT: No overnight events.   SUBJECTIVE: Patient seen and examined at bedside. Denies any headaches or neurologic deficits, no seizures, no fever. All other ROS are negative.     ROS:  CV: Denies chest pain  Resp: Denies SOB  GI: Denies abdominal pain, constipation, diarrhea, nausea, vomiting  : Denies dysuria  ID: Denies fevers, chills      OBJECTIVE:    VITAL SIGNS:  ICU Vital Signs Last 24 Hrs  T(C): 36.8, Max: 36.9 (02-18 @ 21:55)  T(F): 98.3, Max: 98.4 (02-18 @ 21:55)  HR: 84 (84 - 94)  BP: 105/55 (105/55 - 127/74)  BP(mean): 79 (79 - 98)  ABP: --  ABP(mean): --  RR: 18 (16 - 18)  SpO2: 95% (95% - 98%)      I & Os for 24h ending 02-18 @ 07:00  =============================================  IN: 720 ml / OUT: 0 ml / NET: 720 ml    I & Os for current day (as of 02-19 @ 06:19)  =============================================  IN: 550 ml / OUT: 0 ml / NET: 550 ml    CAPILLARY BLOOD GLUCOSE      PHYSICAL EXAM:    General: NAD, comfortable, obese, pleasant  HEENT: NCAT, PERRL, clear conjunctiva, no scleral icterus  Neck: supple, no JVD  Respiratory: CTA b/l, no wheezing, rhonchi, rales  Cardiovascular: RRR, normal S1S2, no M/R/G  Abdomen: soft, NT/ND, bowel sounds in all four quadrants, no palpable masses  Extremities: WWP, no clubbing, cyanosis, or edema  Neuro: AAOX3, speech fluent, no dysarthria. Follows 3 step commands. PERRL, EOMI, VFFC, face symmetric, V1-V3 intact b/l. 5/5 x 4, LT/cold intact b/l. DTR symmetric, FTN intact.    MEDICATIONS:  MEDICATIONS  (STANDING):  heparin  Injectable 7500Unit(s) SubCutaneous every 8 hours  lamoTRIgine 50milliGRAM(s) Oral every 12 hours    MEDICATIONS  (PRN):  acetaminophen   Tablet. 650milliGRAM(s) Oral every 6 hours PRN Moderate Pain (4 - 6)      ALLERGIES:  Allergies    No Known Allergies    Intolerances        LABS:                        13.3   7.2   )-----------( 238      ( 18 Feb 2017 06:27 )             38.9     18 Feb 2017 06:27    138    |  104    |  15     ----------------------------<  90     3.7     |  25     |  0.66     Ca    8.9        18 Feb 2017 06:27  Phos  3.3       18 Feb 2017 06:27  Mg     2.4       18 Feb 2017 06:27      VEEG: Right anterior temporal epileptiform discharges in sleep. No seizures or clinical events captured in last 24 hrs.      .

## 2017-02-19 NOTE — PROGRESS NOTE ADULT - ASSESSMENT
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 So far EEG shows right anterior temporal epileptiform discharges, right anterior temporal slowing, and right anterior temporal rhythmic delta activity (suggestive of right anterior temporal hyperexcitability). 2/9pm she had one electroclinical seizure, right temporal on EEG and characterized as a "weird" feeling in her left hand. 2/12 she had two electroclinical seizures, both right temporal onset, during both she was looking at her left hand, and during one of them she had right hand automatism (picking at wires) and saying "no" several times in a row, and during the other she had left hand shaking. She pressed the button for both seizures but she has no memory of that, and no memory of the nurse coming in to check on her both times. She had two right anterior temporal onset seizures 2/15. For the first one she was off camera, and for the second she looked at her left hand. She had one right anterior temporal onset seizure 2/16 (looked at left hand, felt "weird"). For most seizures so far captured on video, she had ictal water drinking. For one seizure she had a postictal nose wipe with the right hand, for one she had subtle wavering speech while saying one word, and during that seizure had subtle difficulty naming objects on a card. She had an event of clumsiness in her fingers 2/11pm without EEG correlate. The patient had an episode of right thumb twitching 2/14 with occasional involvement of the other fingers of the right hand, which appeared to be a tremor (no EEG correlate). The movement would stop when she straightened her right thumb, then return when she voluntarily flexed it to 90 degrees. Neuropsych evaluation during this admission points to nondominant temporal dysfunction. Semiology is suggestive of right temporal vs right insular ictal onset. We discussed the patient in multidisciplinary epilepsy conference  on 2/17, and the plan is to arrange for WADA testing for further language/memory lateralization.    We are still waiting for one of her "larger" spells characterized by diminished responsiveness and behavior arrest.

## 2017-02-20 PROCEDURE — 99232 SBSQ HOSP IP/OBS MODERATE 35: CPT

## 2017-02-20 PROCEDURE — 95951: CPT | Mod: 26

## 2017-02-20 RX ADMIN — LAMOTRIGINE 150 MILLIGRAM(S): 25 TABLET, ORALLY DISINTEGRATING ORAL at 19:24

## 2017-02-20 RX ADMIN — LAMOTRIGINE 50 MILLIGRAM(S): 25 TABLET, ORALLY DISINTEGRATING ORAL at 00:16

## 2017-02-20 RX ADMIN — Medication 50 MILLIGRAM(S): at 19:25

## 2017-02-20 NOTE — PROGRESS NOTE ADULT - PROBLEM SELECTOR PROBLEM 7
Need for prophylactic measure
Nutrition, metabolism, and development symptoms
Need for prophylactic measure

## 2017-02-20 NOTE — PROGRESS NOTE ADULT - PROBLEM SELECTOR PLAN 3
Crackles heard at the [facility] on exam 2/16; most likley secondary to atelectasis due to immobility as patient has been hooked up to VEEG;   - Incentive spirometry  - encourage ambulation in room, squats
Crackles heard at the [facility] on exam 2/16; most likley secondary to atelectasis due to immobility as patient has been hooked up to VEEG;   -Incentive spirometry  - encourage ambulation in room
Crackles heard at the [facility] on exam 2/16; most likley secondary to atelectasis due to immobility as patient has been hooked up to VEEG;   -Incentive spirometry  - encourage ambulation in room
Crackles heard at the [facility] on exam 2/16; most likley secondary to atelectasis due to immobility as patient has been hooked up to VEEG; improved  - Incentive spirometry  - encourage ambulation in room,
Not on medications at home  - Encourage diet change and exercise
Not on medications at home  - Encourage diet/exercise
Pt reports hx of ablation that did not resolve issue.  Pt also had a recent Holter monitor for 30 days that was insignificant for arrhythmias.    - continue to monitor
Pt reports hx of ablation that did not resolve issue.  Pt also had a recent Holter monitor for 30 days that was insignificant for arrhythmias.    - continue to monitor
Crackles heard at the [facility] on exam 2/16; most likley secondary to atelectasis due to immobility as patient has been hooked up to VEEG;   - Incentive spirometry  - encourage ambulation in room, squats
Not on medications at home  - Encourage diet change and exercise
Not on medications at home  - Encourage diet change and exercise

## 2017-02-20 NOTE — PROGRESS NOTE ADULT - PROBLEM SELECTOR PROBLEM 8
Need for prophylactic measure

## 2017-02-20 NOTE — PROGRESS NOTE ADULT - PROBLEM SELECTOR PLAN 6
As above  - appreciate Dietician recs
HSQ + SCDs  Replete lytes as necessary  Dispo: Keep on 7 lach due to hx of WPW  FULL CODE
HSQ + SCDs  Replete lytes as necessary  Dispo: Keep on 7 lach for hx of WPW  FULL CODE
Regular diet  No IVF
Regular diet  No IVF
As above  - appreciate Dietician recs
HSQ + SCDs  Replete lytes as necessary  Dispo: Keep on 7 lach due to hx of WPW  FULL CODE
HSQ + SCDs  Replete lytes as necessary  Dispo: Keep on 7 lach due to hx of WPW  FULL CODE

## 2017-02-20 NOTE — CONSULT NOTE ADULT - SUBJECTIVE AND OBJECTIVE BOX
Patient's hx and w/u reviewed in detail during our Friday epilepsy conference and also discussed with Dr. Dyson today.  Patient seen and discussed possible surgery for her medically refractory epilepsy.  Will send detailed consult note to Dr. Dyson.  Briefly, pt's w/u suggests sz focus in right hemisphere and likely in right temporal lobe, although some concern about possible insular involvement.  Pt has 2 sz types by history, but so far only one sz type (with left hand sensation at onset) has been recorded w scalp video-EEG.  Scalp onset for this sz appears to be right anterior temporal.  The "staring" sz (w/o left hand sx) has not yet been recorded.  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  Consensus of group was to proceed with IAP and then R subdural or DE electrode implant (with temporal and insula coverage), to identify sz onset zone and then to likely proceed with a resection of presumed epileptogenic brain tissue.  This was discussed extensively with the patient and q's answered.  Pt to f/u with me after IAP done.

## 2017-02-20 NOTE — PROGRESS NOTE ADULT - PROBLEM SELECTOR PLAN 4
As above  - Dietician consult
As above  - appreciate Dietician reccs
As above  - appreciate Dietician recs
Not on medications at home  - Encourage diet change and exercise
Not on medications at home  - Encourage diet change and exercise
Pt reports hx of ablation that did not resolve issue.  Pt also had a recent Holter monitor for 30 days that was insignificant for arrhythmias.    - continue to monitor
As above  - appreciate Dietician reccs
As above  - appreciate Dietician recs
Pt reports hx of ablation that did not resolve issue.  Pt also had a recent Holter monitor for 30 days that was insignificant for arrhythmias.    - continue to monitor

## 2017-02-20 NOTE — PROGRESS NOTE ADULT - PROBLEM SELECTOR PLAN 5
As above  - appreciate Dietician recs
As above  - appreciate Dietician recs
Not on medications at home  - Encourage diet change and exercise
Regular diet  No IVF
Not on medications at home  - Encourage diet change and exercise
Regular diet  No IVF
Regular diet  No IVF

## 2017-02-20 NOTE — PROGRESS NOTE ADULT - PROBLEM SELECTOR PROBLEM 1
Seizure-like activity

## 2017-02-20 NOTE — PROGRESS NOTE ADULT - PROBLEM SELECTOR PLAN 7
HSQ + SCDs  Replete lytes as necessary  Dispo:  7 lach monitoring due to hx of WPW  FULL CODE
Regular diet  No IVF
HSQ + SCDs  Replete lytes as necessary  Dispo: Keep on 7 lach due to hx of WPW  FULL CODE
Regular diet  No IVF

## 2017-02-20 NOTE — PROGRESS NOTE ADULT - PROBLEM SELECTOR PLAN 8
HSQ + SCDs  Replete lytes as necessary  Dispo: 7 lach monitoring due to hx of WPW; will likely be discharged Tuesday on topomax and lamictal  FULL CODE

## 2017-02-20 NOTE — PROGRESS NOTE ADULT - PROBLEM SELECTOR PROBLEM 4
HLD (hyperlipidemia)
HLD (hyperlipidemia)
Morbid obesity
WPW (Kyara-Parkinson-White syndrome)
Morbid obesity
Morbid obesity
WPW (Kyara-Parkinson-White syndrome)

## 2017-02-20 NOTE — PROGRESS NOTE ADULT - PROBLEM SELECTOR PROBLEM 3
Abnormal lung sounds
HLD (hyperlipidemia)
WPW (Kyara-Parkinson-White syndrome)
WPW (Kyara-Parkinson-White syndrome)
Abnormal lung sounds
HLD (hyperlipidemia)
HLD (hyperlipidemia)

## 2017-02-20 NOTE — PROGRESS NOTE ADULT - PROBLEM SELECTOR PROBLEM 5
HLD (hyperlipidemia)
Morbid obesity
Morbid obesity
Nutrition, metabolism, and development symptoms
HLD (hyperlipidemia)
Nutrition, metabolism, and development symptoms
Nutrition, metabolism, and development symptoms

## 2017-02-20 NOTE — PROGRESS NOTE ADULT - PROBLEM SELECTOR PROBLEM 2
Headache
WPW (Kyara-Parkinson-White syndrome)
Headache
WPW (Kyara-Parkinson-White syndrome)
WPW (Kyara-Parkinson-White syndrome)

## 2017-02-20 NOTE — PROGRESS NOTE ADULT - ASSESSMENT
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 So far EEG shows right anterior temporal epileptiform discharges, right anterior temporal slowing, and right anterior temporal rhythmic delta activity (suggestive of right anterior temporal hyperexcitability). 2/9pm she had one electroclinical seizure, right temporal on EEG and characterized as a "weird" feeling in her left hand. 2/12 she had two electroclinical seizures, both right temporal onset, during both she was looking at her left hand, and during one of them she had right hand automatism (picking at wires) and saying "no" several times in a row, and during the other she had left hand shaking. She pressed the button for both seizures but she has no memory of that, and no memory of the nurse coming in to check on her both times. She had two right anterior temporal onset seizures 2/15. For the first one she was off camera, and for the second she looked at her left hand. She had one right anterior temporal onset seizure 2/16 (looked at left hand, felt "weird"). For most seizures so far captured on video, she had ictal water drinking. For one seizure she had a postictal nose wipe with the right hand, for one she had subtle wavering speech while saying one word, and during that seizure had subtle difficulty naming objects on a card. She had an event of clumsiness in her fingers 2/11pm without EEG correlate. The patient had an episode of right thumb twitching 2/14 with occasional involvement of the other fingers of the right hand, which appeared to be a tremor (no EEG correlate). The movement would stop when she straightened her right thumb, then return when she voluntarily flexed it to 90 degrees. Neuropsych evaluation during this admission points to nondominant temporal dysfunction. Semiology is suggestive of right temporal vs right insular ictal onset. We discussed the patient in multidisciplinary epilepsy conference  on 2/17, and the plan is to arrange for WADA testing for further language/memory lateralization.    We are still waiting for one of her "larger" spells characterized by diminished responsiveness and behavior arrest. 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 So far EEG shows right anterior temporal epileptiform discharges, right anterior temporal slowing, and right anterior temporal rhythmic delta activity (suggestive of right anterior temporal hyperexcitability). 2/9pm she had one electroclinical seizure, right temporal on EEG and characterized as a "weird" feeling in her left hand. 2/12 she had two electroclinical seizures, both right temporal onset, during both she was looking at her left hand, and during one of them she had right hand automatism (picking at wires) and saying "no" several times in a row, and during the other she had left hand shaking. She pressed the button for both seizures but she has no memory of that, and no memory of the nurse coming in to check on her both times. She had two right anterior temporal onset seizures 2/15. For the first one she was off camera, and for the second she looked at her left hand. She had one right anterior temporal onset seizure 2/16 (looked at left hand, felt "weird"). For most seizures so far captured on video, she had ictal water drinking. For one seizure she had a postictal nose wipe with the right hand, for one she had subtle wavering speech while saying one word, and during that seizure had subtle difficulty naming objects on a card. She had an event of clumsiness in her fingers 2/11pm without EEG correlate. The patient had an episode of right thumb twitching 2/14 with occasional involvement of the other fingers of the right hand, which appeared to be a tremor (no EEG correlate). The movement would stop when she straightened her right thumb, then return when she voluntarily flexed it to 90 degrees. Neuropsych evaluation during this admission points to nondominant temporal dysfunction. Semiology is suggestive of right temporal vs right insular ictal onset. We discussed the patient in multidisciplinary epilepsy conference  on 2/17, and the plan is to arrange for WADA testing for further language/memory lateralization.    Levi's "larger" spells characterized by diminished responsiveness and behavior arrest were not successfully captured after 14 days of monitoring with AED reduction.

## 2017-02-20 NOTE — PROGRESS NOTE ADULT - PROBLEM SELECTOR PROBLEM 6
Morbid obesity
Need for prophylactic measure
Nutrition, metabolism, and development symptoms
Nutrition, metabolism, and development symptoms
Morbid obesity
Need for prophylactic measure
Need for prophylactic measure

## 2017-02-20 NOTE — PROGRESS NOTE ADULT - SUBJECTIVE AND OBJECTIVE BOX
INTERVAL HPI/OVERNIGHT EVENTS:    OVERNIGHT: No overnight events.  SUBJECTIVE: Patient seen and examined at bedside.     ROS:  CV: Denies chest pain  Resp: Denies SOB  GI: Denies abdominal pain, constipation, diarrhea, nausea, vomiting  : Denies dysuria  ID: Denies fevers, chills  MSK: Denies joint pain     OBJECTIVE:    VITAL SIGNS:  ICU Vital Signs Last 24 Hrs  T(C): 36.7, Max: 36.9 (02-20 @ 05:00)  T(F): 98.1, Max: 98.4 (02-20 @ 05:00)  HR: 80 (74 - 84)  BP: 129/53 (114/65 - 133/56)  BP(mean): 76 (76 - 80)  ABP: --  ABP(mean): --  RR: 16 (16 - 18)  SpO2: 98% (96% - 98%)        CAPILLARY BLOOD GLUCOSE      PHYSICAL EXAM:    General: NAD, comfortable  HEENT: NCAT, PERRL, clear conjunctiva, no scleral icterus  Neck: supple, no JVD  Respiratory: CTA b/l, no wheezing, rhonchi, rales  Cardiovascular: RRR, normal S1S2, no M/R/G  Abdomen: soft, NT/ND, bowel sounds in all four quadrants, no palpable masses  Extremities: WWP, no clubbing, cyanosis, or edema  Neuro:     MEDICATIONS:  MEDICATIONS  (STANDING):  heparin  Injectable 7500Unit(s) SubCutaneous every 8 hours  topiramate 50milliGRAM(s) Oral two times a day  lamoTRIgine 150milliGRAM(s) Oral two times a day    MEDICATIONS  (PRN):  acetaminophen   Tablet. 650milliGRAM(s) Oral every 6 hours PRN Moderate Pain (4 - 6)      ALLERGIES:  Allergies    No Known Allergies    Intolerances        LABS:                RADIOLOGY & ADDITIONAL TESTS: Reviewed. INTERVAL HPI/OVERNIGHT EVENTS:    OVERNIGHT: No overnight events.  SUBJECTIVE: Patient seen and examined at bedside. Patient denies any acute complaints.     OBJECTIVE:    VITAL SIGNS:  ICU Vital Signs Last 24 Hrs  T(C): 36.7, Max: 36.9 (02-20 @ 05:00)  T(F): 98.1, Max: 98.4 (02-20 @ 05:00)  HR: 80 (74 - 84)  BP: 129/53 (114/65 - 133/56)  BP(mean): 76 (76 - 80)  ABP: --  ABP(mean): --  RR: 16 (16 - 18)  SpO2: 98% (96% - 98%)        CAPILLARY BLOOD GLUCOSE      PHYSICAL EXAM:    General: NAD, comfortable, obese, pleasant  HEENT: NCAT, PERRL, clear conjunctiva, no scleral icterus  Neck: supple, no JVD  Respiratory: CTA b/l, no wheezing, rhonchi, rales  Cardiovascular: RRR, normal S1S2, no M/R/G  Abdomen: soft, NT/ND, bowel sounds in all four quadrants, no palpable masses  Extremities: WWP, no clubbing, cyanosis, or edema  Neuro: AAOX3, CN 2-12 grossly intact     MEDICATIONS:  MEDICATIONS  (STANDING):  heparin  Injectable 7500Unit(s) SubCutaneous every 8 hours  topiramate 50milliGRAM(s) Oral two times a day  lamoTRIgine 150milliGRAM(s) Oral two times a day    MEDICATIONS  (PRN):  acetaminophen   Tablet. 650milliGRAM(s) Oral every 6 hours PRN Moderate Pain (4 - 6)      ALLERGIES:  Allergies    No Known Allergies    Intolerances        LABS:                RADIOLOGY & ADDITIONAL TESTS: Reviewed. Pt seen and examined at bedside at 12:20pm.    INTERVAL HPI/OVERNIGHT EVENTS:    OVERNIGHT: No overnight events.  SUBJECTIVE: Patient denies any acute complaints, no pain, no headache, no sz, no palpitations. All other ROS are negative.    OBJECTIVE:    VITAL SIGNS:  ICU Vital Signs Last 24 Hrs  T(C): 36.7, Max: 36.9 (02-20 @ 05:00)  T(F): 98.1, Max: 98.4 (02-20 @ 05:00)  HR: 80 (74 - 84)  BP: 129/53 (114/65 - 133/56)  BP(mean): 76 (76 - 80)  ABP: --  ABP(mean): --  RR: 16 (16 - 18)  SpO2: 98% (96% - 98%)        CAPILLARY BLOOD GLUCOSE      PHYSICAL EXAM:    General: NAD, comfortable, obese, pleasant  HEENT: NCAT, PERRL, clear conjunctiva, no scleral icterus  Neck: supple, no JVD  Respiratory: CTA b/l, no wheezing, rhonchi, rales  Cardiovascular: RRR, normal S1S2, no M/R/G  Abdomen: soft, NT/ND, bowel sounds in all four quadrants, no palpable masses  Extremities: WWP, no clubbing, cyanosis, or edema  Neuro: AAOX3, speech fluent, no dysarthria. Follows 3 step commands. PERRL, EOMI, VFFC, face symmetric, V1-V3 intact b/l. 5/5 x 4, LT/cold intact b/l. DTR symmetric, FTN intact.    MEDICATIONS:  MEDICATIONS  (STANDING):  heparin  Injectable 7500Unit(s) SubCutaneous every 8 hours  topiramate 50milliGRAM(s) Oral two times a day  lamoTRIgine 150milliGRAM(s) Oral two times a day    MEDICATIONS  (PRN):  acetaminophen   Tablet. 650milliGRAM(s) Oral every 6 hours PRN Moderate Pain (4 - 6)      ALLERGIES:  Allergies    No Known Allergies    Intolerances    VEEG: Unchanged. Occasional to frequent right anterior temporal epileptiform discharges. No clinical events or seizures.       RADIOLOGY & ADDITIONAL TESTS: Reviewed.

## 2017-02-20 NOTE — PROGRESS NOTE ADULT - PROBLEM SELECTOR PLAN 1
-c/w VEEG  - resumed home Lamictal dose of 150mg PO BID  - currently off of Keppra and will remain off this medication  - c/w Topamax 50mg PO BID. This will replace Keppra for improved seizure control. Will titrate this medication further as outpatient.  - seizure precautions.  - for convulsive seizure >3 minutes, give ativan 2 mg stat and call epilepsy attending.  - epilepsy surgeon planning on assessing the patient Monday -c/w VEEG  - resumed home Lamictal dose of 150mg PO BID today  - currently off of Keppra and will remain off this medication  - start Topamax 50mg PO BID. This will replace Keppra for improved seizure control. Will titrate this medication further as outpatient.  - seizure precautions.  - for convulsive seizure >3 minutes, give ativan 2 mg stat and call epilepsy attending.  - Appreciate epilepsy surgical consult with Dr. Rodriguez

## 2017-02-21 ENCOUNTER — TRANSCRIPTION ENCOUNTER (OUTPATIENT)
Age: 32
End: 2017-02-21

## 2017-02-21 VITALS
OXYGEN SATURATION: 98 % | HEART RATE: 82 BPM | RESPIRATION RATE: 17 BRPM | SYSTOLIC BLOOD PRESSURE: 121 MMHG | DIASTOLIC BLOOD PRESSURE: 62 MMHG

## 2017-02-21 PROCEDURE — 95951: CPT | Mod: 26

## 2017-02-21 PROCEDURE — 81025 URINE PREGNANCY TEST: CPT

## 2017-02-21 PROCEDURE — 99239 HOSP IP/OBS DSCHRG MGMT >30: CPT

## 2017-02-21 PROCEDURE — 95951: CPT

## 2017-02-21 PROCEDURE — 83735 ASSAY OF MAGNESIUM: CPT

## 2017-02-21 PROCEDURE — 84100 ASSAY OF PHOSPHORUS: CPT

## 2017-02-21 PROCEDURE — 80048 BASIC METABOLIC PNL TOTAL CA: CPT

## 2017-02-21 PROCEDURE — 36415 COLL VENOUS BLD VENIPUNCTURE: CPT

## 2017-02-21 PROCEDURE — 93005 ELECTROCARDIOGRAM TRACING: CPT

## 2017-02-21 PROCEDURE — 85027 COMPLETE CBC AUTOMATED: CPT

## 2017-02-21 RX ORDER — LAMOTRIGINE 25 MG/1
1 TABLET, ORALLY DISINTEGRATING ORAL
Qty: 0 | Refills: 0 | COMMUNITY

## 2017-02-21 RX ORDER — LAMOTRIGINE 25 MG/1
1 TABLET, ORALLY DISINTEGRATING ORAL
Qty: 60 | Refills: 0 | OUTPATIENT
Start: 2017-02-21 | End: 2017-03-23

## 2017-02-21 RX ORDER — TOPIRAMATE 25 MG
1 TABLET ORAL
Qty: 30 | Refills: 0 | OUTPATIENT
Start: 2017-02-21 | End: 2017-03-23

## 2017-02-21 RX ORDER — LEVETIRACETAM 250 MG/1
1500 TABLET, FILM COATED ORAL
Qty: 0 | Refills: 0 | COMMUNITY

## 2017-02-21 RX ADMIN — Medication 50 MILLIGRAM(S): at 06:41

## 2017-02-21 RX ADMIN — LAMOTRIGINE 150 MILLIGRAM(S): 25 TABLET, ORALLY DISINTEGRATING ORAL at 06:41

## 2017-02-21 NOTE — DISCHARGE NOTE ADULT - CARE PROVIDERS DIRECT ADDRESSES
,DirectAddress_Unknown,[email or web address]

## 2017-02-21 NOTE — DISCHARGE NOTE ADULT - CARE PROVIDER_API CALL
Ken Dyson (MD), Clinical Neurophysiology; Neurology  [address] [place] [address]  Phone: [phone]  Fax: [phone]

## 2017-02-21 NOTE — DISCHARGE NOTE ADULT - HOSPITAL COURSE
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 So far EEG shows right anterior temporal epileptiform discharges, right anterior temporal slowing, and right anterior temporal rhythmic delta activity (suggestive of right anterior temporal hyperexcitability). on 2/9pm she had one electroclinical seizure, right temporal on EEG and characterized as a "weird" feeling in her left hand. 2/12 she had two electroclinical seizures, both right temporal onset, during both she was looking at her left hand, and during one of them she had right hand automatism (picking at wires) and saying "no" several times in a row, and during the other she had left hand shaking. She pressed the button for both seizures but she has no memory of that, and no memory of the nurse coming in to check on her both times. She had two right anterior temporal onset seizures 2/15. For the first one she was off camera, and for the second she looked at her left hand. She had one right anterior temporal onset seizure 2/16 (looked at left hand, felt "weird"). For most seizures so far captured on video, she had ictal water drinking. For one seizure she had a postictal nose wipe with the right hand, for one she had subtle wavering speech while saying one word, and during that seizure had subtle difficulty naming objects on a card. She had an event of clumsiness in her fingers 2/11pm without EEG correlate. The patient had an episode of right thumb twitching 2/14 with occasional involvement of the other fingers of the right hand, which appeared to be a tremor (no EEG correlate). The movement would stop when she straightened her right thumb, then return when she voluntarily flexed it to 90 degrees. Neuropsych evaluation during this admission points to nondominant temporal dysfunction. Semiology is suggestive of right temporal vs right insular ictal onset. We discussed the patient in multidisciplinary epilepsy conference  on 2/17, and the plan is to arrange for WADA testing for further language/memory lateralization.    Levi's "larger" spells characterized by diminished responsiveness and behavior arrest were not successfully captured after 14 days of monitoring with AED reduction. she was  evaluated by Neurosurgery and a decision was made to place an electrode implant to identify the seizure onset zone and then to likely proceed with a resection of the presumed epileptogenic brain tissue in the future. 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 EEG shows right anterior temporal epileptiform discharges, right anterior temporal slowing, and right anterior temporal rhythmic delta activity (suggestive of right anterior temporal hyperexcitability). On 2/9pm she had one electroclinical seizure, right temporal on EEG and characterized as a "weird" feeling in her left hand. 2/12 she had two electroclinical seizures, both right temporal onset, during both she was looking at her left hand, and during one of them she had right hand automatism (picking at wires) and saying "no" several times in a row, and during the other she had left hand shaking. She pressed the button for both seizures but she has no memory of that, and no memory of the nurse coming in to check on her both times. She had two right anterior temporal onset seizures 2/15. For the first one she was off camera, and for the second she looked at her left hand. She had one right anterior temporal onset seizure 2/16 (looked at left hand, felt "weird"). For most seizures captured on video, she had ictal water drinking. For one seizure she had a postictal nose wipe with the right hand, for one she had subtle wavering speech while saying one word, and during that seizure had subtle difficulty naming objects on a card. She had an event of clumsiness in her fingers 2/11pm without EEG correlate. The patient had an episode of right thumb twitching 2/14 with occasional involvement of the other fingers of the right hand, which appeared to be a tremor (no EEG correlate). The movement would stop when she straightened her right thumb, then return when she voluntarily flexed it to 90 degrees. Neuropsych evaluation during this admission points to nondominant temporal dysfunction. Semiology is suggestive of right temporal vs right insular ictal onset. We discussed the patient in multidisciplinary epilepsy conference  on 2/17, and the plan is to arrange for WADA testing for further language/memory lateralization.    Levi's "larger" spells characterized by diminished responsiveness and behavior arrest were not successfully captured after 14 days of monitoring with AED reduction. She was evaluated by neurosurgery as part of the pre-surgical work-up.

## 2017-02-21 NOTE — DISCHARGE NOTE ADULT - PLAN OF CARE
Monitor seizure acitivity and Titrate medication You were evaluated in the hospital for characterization of your seizure activity via EEG monitoring. Your EEG during your stay showed right anterior temporal epileptiform discharges, right anterior temporal slowing, and right anterior temporal rhythmic delta activity (suggestive of right anterior temporal hyperexcitability) Brain MRI shows subtle abnormality of right anterior/medial temporal neocortex and amygdala/uncus, c/w focal cortical dysplasia. monitor Heart rhythm YOu came in to [facility] with a diagnosis of WPW. Please continue to follow up with your cardiologist as an outpatient for your condition. If you develop chest pain or feelings of an increased or slowed heart beat, Please visit your nearest emergency room immediately. You were evaluated in the hospital for characterization of your seizure activity via EEG monitoring. Your EEG during your stay showed right anterior temporal epileptiform discharges, right anterior temporal slowing, and right anterior temporal rhythmic delta activity (suggestive of right anterior temporal hyperexcitability). Brain MRI showed subtle abnormality of right anterior/medial temporal neocortex and amygdala/uncus, c/w focal cortical dysplasia. You were evaluated by Neurosurgery and a decision was made to place an electrode implant to identify the seizure onset zone and then to likely proceed with a resection of the presumed epileptogenic brain tissue in the future. In the meantime, you eill be discharged on a new anti-epileptic medication called Topomax which you will take 50 mg every 12 hours. You will continue to take your home dose of lamicatal at 150 mg every 12 hours. You will STOP taking your home dose of KEPPRA for the time being. Please follow up with Dr barbour on Decrease BMI under 40 Please follow up with You came in to [facility] with a diagnosis of WPW. Please continue to follow up with your cardiologist as an outpatient for your condition. If you develop chest pain or feelings of an increased or slowed heart beat, Please visit your nearest emergency room immediately. You were evaluated in the hospital for characterization of your seizure activity via EEG monitoring. Your EEG during your stay showed right anterior temporal epileptiform discharges, right anterior temporal slowing, and right anterior temporal rhythmic delta activity (suggestive of right anterior temporal hyperexcitability). Brain MRI showed subtle abnormality of right anterior/medial temporal neocortex and amygdala/uncus, c/w focal cortical dysplasia. You were evaluated by Neurosurgery and a decision was made to place an electrode implants to identify the seizure onset zone and then to likely proceed with a resection of the presumed epileptogenic brain tissue in the future. In the meantime, you will be discharged on a new anti-epileptic medication called Topomax which you will take 50 mg every day for the first week and increase by 50 mg after every week that passes.  For example, by the 2nd week you will increase to 50 twice a day. By the third week, it will be 50 mg in the morning and 100 mg in the evening. You will keep titrating to a current goal of 200 mg BID. You will continue to take your home dose of lamicatal starting first at 100 mg twice a day and after every week add 50 mg to the dose until you reach a goal of 150 mg twice a day. For example, by the 2nd week you will increase You will STOP taking your home dose of KEPPRA. Please follow up with Dr barbour as an outpatient in 2 weeks for further care. You were evaluated in the hospital for characterization of your seizure activity via EEG monitoring. Your EEG during your stay showed right anterior temporal epileptiform discharges, right anterior temporal slowing, and right anterior temporal rhythmic delta activity (suggestive of right anterior temporal hyperexcitability). Brain MRI showed subtle abnormality of right anterior/medial temporal neocortex and amygdala/uncus, c/w focal cortical dysplasia. You were evaluated by Neurosurgery and a decision was made to place an electrode implants to identify the seizure onset zone and then to likely proceed with a resection of the presumed epileptogenic brain tissue in the future. In the meantime, you will be discharged on a new anti-epileptic medication called Topomax which you will take 50 mg every day for the first week and increase by 50 mg after every week that passes.  For example, by the 2nd week you will increase to 50 twice a day. By the third week, it will be 50 mg in the morning and 100 mg in the evening. You will keep titrating to a current goal of 200 mg BID. You will continue to take your home dose of lamicatal starting first at 100 mg twice a day and after every week add 50 mg to the dose until you reach a goal of 150 mg twice a day. For example, by the 2nd week you will increase to 150 mg in the am and 100 mg in the pm. The goal is titrating up to 150 mg BID.  You will STOP taking your home dose of KEPPRA. Please follow up with Dr barbour as an outpatient in 2 weeks for further care. You were evaluated in the hospital for characterization of your seizure activity via EEG monitoring. Your EEG during your stay showed right anterior temporal epileptiform discharges, right anterior temporal slowing, and right anterior temporal rhythmic delta activity (suggestive of right anterior temporal hyperexcitability). Brain MRI showed subtle abnormality of right anterior/medial temporal neocortex and amygdala/uncus, c/w focal cortical dysplasia. You were evaluated by Neurosurgery and a decision was made to place an electrode implants to identify the seizure onset zone and then to likely proceed with a resection of the presumed epileptogenic brain tissue in the future. In the meantime, you will be discharged on a new anti-epileptic medication called Topomax which you will take 50 mg every day for the first week and increase by 50 mg after every week that passes.  For example, by the 2nd week you will increase to 50 twice a day. By the third week, it will be 50 mg in the morning and 100 mg in the evening. You will keep titrating to a current goal of 200 mg BID. You will continue to take your home dose of lamicatal starting first at 100 mg twice a day and after every week add 50 mg to the dose until you reach a goal of 150 mg twice a day. For example, by the 2nd week you will increase to 150 mg in the am and 100 mg in the pm. The goal is titrating up to 150 mg BID.  You will STOP taking your home dose of KEPPRA. Please follow up with Dr barbour as an outpatient in 2 weeks for further care. Please refain from driving until further notice from your neurologist. You came in to [facility] with a diagnosis of WPW. Please continue to follow up with your cardiologist as an outpatient for your condition. If you develop chest pain or feelings of an increased or slowed heart beat, Please visit your nearest emergency room immediately. You were evaluated in the hospital for characterization of your seizure activity via EEG monitoring. Your EEG during your stay showed right anterior temporal epileptiform discharges, right anterior temporal slowing, and right anterior temporal rhythmic delta activity (suggestive of right anterior temporal hyperexcitability). Brain MRI showed subtle abnormality of right anterior/medial temporal neocortex and amygdala/uncus, suggestive of focal cortical dysplasia. You will be discharged on a new anti-epileptic medication called Topamax which you will take 50 mg every day for the first week and increase by 50 mg each week until at 200 mg twice daily. For example, by the 2nd week you will increase to 50 mg twice a day. By the third week, it will be 50 mg in the morning and 100 mg in the evening. You will keep titrating to a goal of 200 mg twice daily. You will continue to take Lamicatal starting first at 100 mg twice a day and after every week add 50 mg to the dose until you reach a goal of 200 mg twice a day. You will STOP taking your home dose of KEPPRA. Please follow up with Dr. Dyson as an outpatient in 2 weeks for further care. Do NOT drive until further notice from your neurologist.

## 2017-02-21 NOTE — DISCHARGE NOTE ADULT - CARE PLAN
Principal Discharge DX:	Seizure-like activity  Goal:	Monitor seizure acitivity and Titrate medication  Instructions for follow-up, activity and diet:	You were evaluated in the hospital for characterization of your seizure activity via EEG monitoring. Your EEG during your stay showed right anterior temporal epileptiform discharges, right anterior temporal slowing, and right anterior temporal rhythmic delta activity (suggestive of right anterior temporal hyperexcitability) Brain MRI shows subtle abnormality of right anterior/medial temporal neocortex and amygdala/uncus, c/w focal cortical dysplasia.  Secondary Diagnosis:	WPW (Kyara-Parkinson-White syndrome)  Secondary Diagnosis:	Morbid obesity Principal Discharge DX:	Seizure-like activity  Goal:	Monitor seizure acitivity and Titrate medication  Instructions for follow-up, activity and diet:	You were evaluated in the hospital for characterization of your seizure activity via EEG monitoring. Your EEG during your stay showed right anterior temporal epileptiform discharges, right anterior temporal slowing, and right anterior temporal rhythmic delta activity (suggestive of right anterior temporal hyperexcitability). Brain MRI showed subtle abnormality of right anterior/medial temporal neocortex and amygdala/uncus, c/w focal cortical dysplasia. You were evaluated by Neurosurgery and a decision was made to place an electrode implant to identify the seizure onset zone and then to likely proceed with a resection of the presumed epileptogenic brain tissue in the future. In the meantime, you eill be discharged on a new anti-epileptic medication called Topomax which you will take 50 mg every 12 hours. You will continue to take your home dose of lamicatal at 150 mg every 12 hours. You will STOP taking your home dose of KEPPRA for the time being. Please follow up with Dr barbour on  Secondary Diagnosis:	WPW (Kyara-Parkinson-White syndrome)  Goal:	monitor Heart rhythm  Instructions for follow-up, activity and diet:	YOu came in to [facility] with a diagnosis of WPW. Please continue to follow up with your cardiologist as an outpatient for your condition. If you develop chest pain or feelings of an increased or slowed heart beat, Please visit your nearest emergency room immediately.  Secondary Diagnosis:	Morbid obesity Principal Discharge DX:	Seizure-like activity  Goal:	Monitor seizure acitivity and Titrate medication  Instructions for follow-up, activity and diet:	You were evaluated in the hospital for characterization of your seizure activity via EEG monitoring. Your EEG during your stay showed right anterior temporal epileptiform discharges, right anterior temporal slowing, and right anterior temporal rhythmic delta activity (suggestive of right anterior temporal hyperexcitability). Brain MRI showed subtle abnormality of right anterior/medial temporal neocortex and amygdala/uncus, c/w focal cortical dysplasia. You were evaluated by Neurosurgery and a decision was made to place an electrode implants to identify the seizure onset zone and then to likely proceed with a resection of the presumed epileptogenic brain tissue in the future. In the meantime, you will be discharged on a new anti-epileptic medication called Topomax which you will take 50 mg every day for the first week and increase by 50 mg after every week that passes.  For example, by the 2nd week you will increase to 50 twice a day. By the third week, it will be 50 mg in the morning and 100 mg in the evening. You will keep titrating to a current goal of 200 mg BID. You will continue to take your home dose of lamicatal starting first at 100 mg twice a day and after every week add 50 mg to the dose until you reach a goal of 150 mg twice a day. For example, by the 2nd week you will increase You will STOP taking your home dose of KEPPRA. Please follow up with Dr barbour as an outpatient in 2 weeks for further care.  Secondary Diagnosis:	WPW (Kyara-Parkinson-White syndrome)  Goal:	monitor Heart rhythm  Instructions for follow-up, activity and diet:	You came in to [facility] with a diagnosis of WPW. Please continue to follow up with your cardiologist as an outpatient for your condition. If you develop chest pain or feelings of an increased or slowed heart beat, Please visit your nearest emergency room immediately.  Secondary Diagnosis:	Morbid obesity  Goal:	Decrease BMI under 40  Instructions for follow-up, activity and diet:	Please follow up with Principal Discharge DX:	Seizure-like activity  Goal:	Monitor seizure acitivity and Titrate medication  Instructions for follow-up, activity and diet:	You were evaluated in the hospital for characterization of your seizure activity via EEG monitoring. Your EEG during your stay showed right anterior temporal epileptiform discharges, right anterior temporal slowing, and right anterior temporal rhythmic delta activity (suggestive of right anterior temporal hyperexcitability). Brain MRI showed subtle abnormality of right anterior/medial temporal neocortex and amygdala/uncus, c/w focal cortical dysplasia. You were evaluated by Neurosurgery and a decision was made to place an electrode implants to identify the seizure onset zone and then to likely proceed with a resection of the presumed epileptogenic brain tissue in the future. In the meantime, you will be discharged on a new anti-epileptic medication called Topomax which you will take 50 mg every day for the first week and increase by 50 mg after every week that passes.  For example, by the 2nd week you will increase to 50 twice a day. By the third week, it will be 50 mg in the morning and 100 mg in the evening. You will keep titrating to a current goal of 200 mg BID. You will continue to take your home dose of lamicatal starting first at 100 mg twice a day and after every week add 50 mg to the dose until you reach a goal of 150 mg twice a day. For example, by the 2nd week you will increase to 150 mg in the am and 100 mg in the pm. The goal is titrating up to 150 mg BID.  You will STOP taking your home dose of KEPPRA. Please follow up with Dr barbour as an outpatient in 2 weeks for further care.  Secondary Diagnosis:	WPW (Kyara-Parkinson-White syndrome)  Goal:	monitor Heart rhythm  Instructions for follow-up, activity and diet:	You came in to [facility] with a diagnosis of WPW. Please continue to follow up with your cardiologist as an outpatient for your condition. If you develop chest pain or feelings of an increased or slowed heart beat, Please visit your nearest emergency room immediately. Principal Discharge DX:	Seizure-like activity  Goal:	Monitor seizure acitivity and Titrate medication  Instructions for follow-up, activity and diet:	You were evaluated in the hospital for characterization of your seizure activity via EEG monitoring. Your EEG during your stay showed right anterior temporal epileptiform discharges, right anterior temporal slowing, and right anterior temporal rhythmic delta activity (suggestive of right anterior temporal hyperexcitability). Brain MRI showed subtle abnormality of right anterior/medial temporal neocortex and amygdala/uncus, c/w focal cortical dysplasia. You were evaluated by Neurosurgery and a decision was made to place an electrode implants to identify the seizure onset zone and then to likely proceed with a resection of the presumed epileptogenic brain tissue in the future. In the meantime, you will be discharged on a new anti-epileptic medication called Topomax which you will take 50 mg every day for the first week and increase by 50 mg after every week that passes.  For example, by the 2nd week you will increase to 50 twice a day. By the third week, it will be 50 mg in the morning and 100 mg in the evening. You will keep titrating to a current goal of 200 mg BID. You will continue to take your home dose of lamicatal starting first at 100 mg twice a day and after every week add 50 mg to the dose until you reach a goal of 150 mg twice a day. For example, by the 2nd week you will increase to 150 mg in the am and 100 mg in the pm. The goal is titrating up to 150 mg BID.  You will STOP taking your home dose of KEPPRA. Please follow up with Dr barbour as an outpatient in 2 weeks for further care. Please refain from driving until further notice from your neurologist.  Secondary Diagnosis:	WPW (Kyara-Parkinson-White syndrome)  Goal:	monitor Heart rhythm  Instructions for follow-up, activity and diet:	You came in to [facility] with a diagnosis of WPW. Please continue to follow up with your cardiologist as an outpatient for your condition. If you develop chest pain or feelings of an increased or slowed heart beat, Please visit your nearest emergency room immediately. Principal Discharge DX:	Seizure-like activity  Goal:	Monitor seizure acitivity and Titrate medication  Instructions for follow-up, activity and diet:	You were evaluated in the hospital for characterization of your seizure activity via EEG monitoring. Your EEG during your stay showed right anterior temporal epileptiform discharges, right anterior temporal slowing, and right anterior temporal rhythmic delta activity (suggestive of right anterior temporal hyperexcitability). Brain MRI showed subtle abnormality of right anterior/medial temporal neocortex and amygdala/uncus, c/w focal cortical dysplasia. You were evaluated by Neurosurgery and a decision was made to place an electrode implants to identify the seizure onset zone and then to likely proceed with a resection of the presumed epileptogenic brain tissue in the future. In the meantime, you will be discharged on a new anti-epileptic medication called Topomax which you will take 50 mg every day for the first week and increase by 50 mg after every week that passes.  For example, by the 2nd week you will increase to 50 twice a day. By the third week, it will be 50 mg in the morning and 100 mg in the evening. You will keep titrating to a current goal of 200 mg BID. You will continue to take your home dose of lamicatal starting first at 100 mg twice a day and after every week add 50 mg to the dose until you reach a goal of 150 mg twice a day. For example, by the 2nd week you will increase to 150 mg in the am and 100 mg in the pm. The goal is titrating up to 150 mg BID.  You will STOP taking your home dose of KEPPRA. Please follow up with Dr barbour as an outpatient in 2 weeks for further care. Please refain from driving until further notice from your neurologist.  Secondary Diagnosis:	WPW (Kyara-Parkinson-White syndrome)  Goal:	monitor Heart rhythm  Instructions for follow-up, activity and diet:	You came in to [facility] with a diagnosis of WPW. Please continue to follow up with your cardiologist as an outpatient for your condition. If you develop chest pain or feelings of an increased or slowed heart beat, Please visit your nearest emergency room immediately. Principal Discharge DX:	Seizure-like activity  Goal:	Monitor seizure acitivity and Titrate medication  Instructions for follow-up, activity and diet:	You were evaluated in the hospital for characterization of your seizure activity via EEG monitoring. Your EEG during your stay showed right anterior temporal epileptiform discharges, right anterior temporal slowing, and right anterior temporal rhythmic delta activity (suggestive of right anterior temporal hyperexcitability). Brain MRI showed subtle abnormality of right anterior/medial temporal neocortex and amygdala/uncus, suggestive of focal cortical dysplasia. You will be discharged on a new anti-epileptic medication called Topamax which you will take 50 mg every day for the first week and increase by 50 mg each week until at 200 mg twice daily. For example, by the 2nd week you will increase to 50 mg twice a day. By the third week, it will be 50 mg in the morning and 100 mg in the evening. You will keep titrating to a goal of 200 mg twice daily. You will continue to take Lamicatal starting first at 100 mg twice a day and after every week add 50 mg to the dose until you reach a goal of 200 mg twice a day. You will STOP taking your home dose of KEPPRA. Please follow up with Dr. Dyson as an outpatient in 2 weeks for further care. Do NOT drive until further notice from your neurologist.  Secondary Diagnosis:	WPW (Kyara-Parkinson-White syndrome)  Goal:	monitor Heart rhythm  Instructions for follow-up, activity and diet:	You came in to [facility] with a diagnosis of WPW. Please continue to follow up with your cardiologist as an outpatient for your condition. If you develop chest pain or feelings of an increased or slowed heart beat, Please visit your nearest emergency room immediately.

## 2017-02-21 NOTE — DISCHARGE NOTE ADULT - MEDICATION SUMMARY - MEDICATIONS TO TAKE
I will START or STAY ON the medications listed below when I get home from the hospital:    topiramate 50 mg oral tablet  -- 1 tab(s) by mouth once a day for the first week then titrate to 1 tab twice a day for the 2nd week and titrate by 50 mg every week   -- Indication: For Seizure-like activity    LaMICtal 100 mg oral tablet  -- 1 tab(s) by mouth every 12 hours for the first week; then take 1 tablet in the am and 1.5 tablet in the pm for the second week   -- Avoid prolonged or excessive exposure to direct and/or artificial sunlight while taking this medication.  It is very important that you take or use this exactly as directed.  Do not skip doses or discontinue unless directed by your doctor.  May cause drowsiness.  Alcohol may intensify this effect.  Use care when operating dangerous machinery.    -- Indication: For Seizure-like activity

## 2017-02-21 NOTE — DISCHARGE NOTE ADULT - PATIENT PORTAL LINK FT
“You can access the Follow[facility] Patient Portal, offered by [facility], by registering with the following website: [email or web address]”

## 2017-02-21 NOTE — DISCHARGE NOTE ADULT - MEDICATION SUMMARY - MEDICATIONS TO STOP TAKING
I will STOP taking the medications listed below when I get home from the hospital:    Lexapro 10 mg oral tablet  -- 1 tab(s) by mouth once a day    Keppra 1000 mg oral tablet  -- Take 1500mg (one and one-half) tablets by mouth 2 times a day  -- Check with your doctor before becoming pregnant.  It is very important that you take or use this exactly as directed.  Do not skip doses or discontinue unless directed by your doctor.  May cause drowsiness or dizziness.  Obtain medical advice before taking any non-prescription drugs as some may affect the action of this medication.  Swallow whole.  Do not crush.  This drug may impair the ability to drive or operate machinery.  Use care until you become familiar with its effects.    lamoTRIgine 25 mg oral tablet  -- Take 1 tab(s) by mouth once a day for two weeks, followed by one tab by mouth twice a day for one week    LaMICtal 150 mg oral tablet  -- 1 tab(s) by mouth 2 times a day    Keppra  -- 1500 milligram(s) by mouth 2 times a day

## 2017-02-22 ENCOUNTER — APPOINTMENT (OUTPATIENT)
Dept: NEUROLOGY | Facility: CLINIC | Age: 32
End: 2017-02-22

## 2017-02-23 ENCOUNTER — APPOINTMENT (OUTPATIENT)
Dept: NEUROSURGERY | Facility: CLINIC | Age: 32
End: 2017-02-23

## 2017-02-23 VITALS
OXYGEN SATURATION: 98 % | DIASTOLIC BLOOD PRESSURE: 82 MMHG | SYSTOLIC BLOOD PRESSURE: 138 MMHG | HEIGHT: 62 IN | BODY MASS INDEX: 53.92 KG/M2 | HEART RATE: 99 BPM | WEIGHT: 293 LBS

## 2017-02-23 DIAGNOSIS — R42 DIZZINESS AND GIDDINESS: ICD-10-CM

## 2017-02-23 DIAGNOSIS — G40.802 OTHER EPILEPSY, NOT INTRACTABLE, WITHOUT STATUS EPILEPTICUS: ICD-10-CM

## 2017-02-23 DIAGNOSIS — Z87.442 PERSONAL HISTORY OF URINARY CALCULI: ICD-10-CM

## 2017-02-23 DIAGNOSIS — R51 HEADACHE: ICD-10-CM

## 2017-02-23 DIAGNOSIS — Z82.3 FAMILY HISTORY OF STROKE: ICD-10-CM

## 2017-02-23 DIAGNOSIS — E66.01 MORBID (SEVERE) OBESITY DUE TO EXCESS CALORIES: ICD-10-CM

## 2017-02-23 DIAGNOSIS — I45.6 PRE-EXCITATION SYNDROME: ICD-10-CM

## 2017-02-23 DIAGNOSIS — Q04.8 OTHER SPECIFIED CONGENITAL MALFORMATIONS OF BRAIN: ICD-10-CM

## 2017-02-23 DIAGNOSIS — Z86.69 PERSONAL HISTORY OF OTHER DISEASES OF THE NERVOUS SYSTEM AND SENSE ORGANS: ICD-10-CM

## 2017-02-23 DIAGNOSIS — R56.9 UNSPECIFIED CONVULSIONS: ICD-10-CM

## 2017-02-23 DIAGNOSIS — Z87.42 PERSONAL HISTORY OF OTHER DISEASES OF THE FEMALE GENITAL TRACT: ICD-10-CM

## 2017-02-23 DIAGNOSIS — Z83.3 FAMILY HISTORY OF DIABETES MELLITUS: ICD-10-CM

## 2017-02-23 DIAGNOSIS — E78.00 PURE HYPERCHOLESTEROLEMIA, UNSPECIFIED: ICD-10-CM

## 2017-02-23 DIAGNOSIS — E78.5 HYPERLIPIDEMIA, UNSPECIFIED: ICD-10-CM

## 2017-02-23 DIAGNOSIS — Z82.49 FAMILY HISTORY OF ISCHEMIC HEART DISEASE AND OTHER DISEASES OF THE CIRCULATORY SYSTEM: ICD-10-CM

## 2017-02-24 PROBLEM — Z87.442 HISTORY OF RENAL CALCULI: Status: RESOLVED | Noted: 2017-02-24 | Resolved: 2017-02-24

## 2017-02-24 PROBLEM — Z87.42 HISTORY OF OVARIAN CYST: Status: RESOLVED | Noted: 2017-02-24 | Resolved: 2017-02-24

## 2017-02-24 PROBLEM — E66.01 MORBID OBESITY: Status: RESOLVED | Noted: 2017-02-24 | Resolved: 2017-02-24

## 2017-03-01 ENCOUNTER — APPOINTMENT (OUTPATIENT)
Dept: NEUROLOGY | Facility: CLINIC | Age: 32
End: 2017-03-01

## 2017-03-01 VITALS
OXYGEN SATURATION: 96 % | DIASTOLIC BLOOD PRESSURE: 79 MMHG | HEIGHT: 61.5 IN | SYSTOLIC BLOOD PRESSURE: 143 MMHG | HEART RATE: 108 BPM | WEIGHT: 293 LBS | TEMPERATURE: 98.1 F | BODY MASS INDEX: 54.61 KG/M2

## 2017-03-01 RX ORDER — LAMOTRIGINE 100 MG/1
100 TABLET ORAL
Qty: 60 | Refills: 0 | Status: ACTIVE | COMMUNITY
Start: 2017-02-21

## 2017-03-01 RX ORDER — CHLORHEXIDINE GLUCONATE, 0.12% ORAL RINSE 1.2 MG/ML
0.12 SOLUTION DENTAL
Qty: 473 | Refills: 0 | Status: ACTIVE | COMMUNITY
Start: 2017-01-23

## 2017-03-15 ENCOUNTER — RX RENEWAL (OUTPATIENT)
Age: 32
End: 2017-03-15

## 2017-03-15 RX ORDER — LAMOTRIGINE 50 MG/1
50 TABLET ORAL
Qty: 120 | Refills: 3 | Status: ACTIVE | COMMUNITY
Start: 2017-03-15 | End: 1900-01-01

## 2017-03-31 ENCOUNTER — APPOINTMENT (OUTPATIENT)
Dept: NEUROLOGY | Facility: CLINIC | Age: 32
End: 2017-03-31

## 2017-04-07 ENCOUNTER — RX RENEWAL (OUTPATIENT)
Age: 32
End: 2017-04-07

## 2017-04-07 RX ORDER — TOPIRAMATE 50 MG/1
50 TABLET, FILM COATED ORAL
Qty: 120 | Refills: 3 | Status: ACTIVE | COMMUNITY
Start: 2017-03-15 | End: 1900-01-01

## 2017-04-18 ENCOUNTER — RX RENEWAL (OUTPATIENT)
Age: 32
End: 2017-04-18

## 2017-04-18 RX ORDER — LAMOTRIGINE 200 MG/1
200 TABLET ORAL TWICE DAILY
Qty: 60 | Refills: 5 | Status: ACTIVE | COMMUNITY
Start: 2017-01-25 | End: 1900-01-01

## 2017-04-21 ENCOUNTER — RX RENEWAL (OUTPATIENT)
Age: 32
End: 2017-04-21

## 2017-04-21 RX ORDER — TOPIRAMATE 200 MG/1
200 TABLET ORAL TWICE DAILY
Qty: 60 | Refills: 5 | Status: ACTIVE | COMMUNITY
Start: 2017-02-21 | End: 1900-01-01

## 2017-07-19 ENCOUNTER — OTHER (OUTPATIENT)
Age: 32
End: 2017-07-19

## 2019-07-23 NOTE — PROGRESS NOTE ADULT - SUBJECTIVE AND OBJECTIVE BOX
Nurs supervisor called, stated no sitters at this time Overnight Events: PRATIMA    Subjective: Patient seen and examined at bedside. Patient states she did not have any events overnight, denies any other issues. Feels well, continues to be perturbed about not having seizure, however is more preoccupied with status of neighbor who's family is friendly with patient. Patient denies: HA, Changes in vision, dizziness, LOC, CP, Palpitations, SOB, cough, abdominal pain, N/V/D. ROS otherwise negative.     [OBJECTIVE]:    Vital Signs:  T(F): , Max: 98.4 (02-18 @ 01:14)  HR:  (87 - 98)  BP:  (111/64 - 125/58)  BP(mean):  (83 - 98)  RR:  (14 - 16)  SpO2:  (96% - 98%)  Wt(kg): --  CVP(cm H2O): --    I & Os for 24h ending 02-18 @ 07:00  =============================================  IN: 720 ml / OUT: 0 ml / NET: 720 ml    I & Os for current day (as of 02-18 @ 15:24)  =============================================  IN: 400 ml / OUT: 0 ml / NET: 400 ml    CAPILLARY BLOOD GLUCOSE      Physcial Exam:  T(F): 98.3  HR: 89  BP: 117/55  RR: 16  SpO2: 96%  Wt(kg): --    General: AO x 3, NAD, Comfortable, Pleasant, Obese, EEG leads in place  HEENT: PERRL/ EOMI, no scleral icterus, no ptosis, MMM, JVD, no thyromegaly  Respiratory: CTA b/l, no wheezes, rales or rhonchi  Cardiovascular: Regular, +S1 + S2  Abdomen: Soft, NTND, Obese, normoactive bowel sounds, no rebound, no guarding, no suprapubic tenderness  Extremities: No cyanosis, no clubbing, no edema, pulses equal, no calf tenderness  Neurological: CN II-XII grossly intact, follows commands, moves all extremities    Medications:  MEDICATIONS  (STANDING):  heparin  Injectable 7500Unit(s) SubCutaneous every 8 hours  lamoTRIgine 50milliGRAM(s) Oral every 12 hours    MEDICATIONS  (PRN):  acetaminophen   Tablet. 650milliGRAM(s) Oral every 6 hours PRN Moderate Pain (4 - 6)      Allergies:  Allergies    No Known Allergies    Intolerances        Labs:                        13.3   7.2   )-----------( 238      ( 18 Feb 2017 06:27 )             38.9     18 Feb 2017 06:27    138    |  104    |  15     ----------------------------<  90     3.7     |  25     |  0.66     Ca    8.9        18 Feb 2017 06:27  Phos  3.3       18 Feb 2017 06:27  Mg     2.4       18 Feb 2017 06:27            Radiology and other tests:  Reviewed 2/18/17- pt seen and examined at 1pm.    Overnight Events: PRATIMA    Subjective: Patient seen and examined at bedside. Patient states she did not have any events overnight, denies any other issues. Feels well, continues to be perturbed about not having seizure, however is more preoccupied with status of neighbor who's family is friendly with patient. Patient denies: HA, Changes in vision, dizziness, LOC, CP, Palpitations, SOB, cough, abdominal pain, N/V/D. ROS otherwise negative.     [OBJECTIVE]:    Vital Signs:  T(F): , Max: 98.4 (02-18 @ 01:14)  HR:  (87 - 98)  BP:  (111/64 - 125/58)  BP(mean):  (83 - 98)  RR:  (14 - 16)  SpO2:  (96% - 98%)  Wt(kg): --  CVP(cm H2O): --    I & Os for 24h ending 02-18 @ 07:00  =============================================  IN: 720 ml / OUT: 0 ml / NET: 720 ml    I & Os for current day (as of 02-18 @ 15:24)  =============================================  IN: 400 ml / OUT: 0 ml / NET: 400 ml    CAPILLARY BLOOD GLUCOSE      Physcial Exam:  T(F): 98.3  HR: 89  BP: 117/55  RR: 16  SpO2: 96%  Wt(kg): --    General: NAD, Comfortable, Pleasant, Obese, EEG leads in place  HEENT: PERRL/ EOMI, no scleral icterus, no ptosis, MMM, JVD, no thyromegaly  Respiratory: CTA b/l, no wheezes, rales or rhonchi  Cardiovascular: Regular, +S1 + S2  Abdomen: Soft, NTND, Obese, normoactive bowel sounds, no rebound, no guarding, no suprapubic tenderness  Extremities: No cyanosis, no clubbing, no edema, pulses equal, no calf tenderness  Neurological: AAOX 3, CN II-XII grossly intact, follows 3step  commands, 5/5 x 4 strength, LT intact b/l    Medications:  MEDICATIONS  (STANDING):  heparin  Injectable 7500Unit(s) SubCutaneous every 8 hours  lamoTRIgine 50milliGRAM(s) Oral every 12 hours    MEDICATIONS  (PRN):  acetaminophen   Tablet. 650milliGRAM(s) Oral every 6 hours PRN Moderate Pain (4 - 6)      Allergies:  Allergies    No Known Allergies    Intolerances        Labs:                        13.3   7.2   )-----------( 238      ( 18 Feb 2017 06:27 )             38.9     18 Feb 2017 06:27    138    |  104    |  15     ----------------------------<  90     3.7     |  25     |  0.66     Ca    8.9        18 Feb 2017 06:27  Phos  3.3       18 Feb 2017 06:27  Mg     2.4       18 Feb 2017 06:27      Radiology and other tests:  Reviewed    VEEG:   Day 12, 2/17-2/18  Lamotrigine 50mg BID, Levetiracetam held  Normal background of wakefulness characterized by alpha and beta frequencies with a PDR of 10-11hz.  Brief runs of right TIRDA were present without clinical changes at times in wakefulness.  Frequent right anterior temporal spike and wave epileptiform discharges were present in sleep.  T4, T6 electrode artifacts were present throughout the daytime, repaired at 10pm.    There was one clinical event of perceived left hand shaking with possible subtle right temporal electrographic correlate described below:  d1 08:43:56  		Electrographic onset: 2-3 hz rhythmic delta over R anterior temporal head region  d1 08:43:57  		looks at L hand  d1 08:44:00  		feels something right now  d1 08:44:04  		Patient Event  d1 08:44:04  		pushes button with L hand  d1 08:44:06  		R posterior temporal electrode artifacts- poor contact  d1 08:44:14  		feels that hand is shaking  d1 08:44:21  		hr 90  d1 08:44:38  		Electrographic offset: background normal    Read by: Ghazala Bailey D.O.

## 2022-09-27 NOTE — PROGRESS NOTE ADULT - ASSESSMENT
30 y/o RH F PMHx WPW (s/p ablation), HLD, morbid obesity, presents w/ 7 months of intermittent left hand shaking and memory loss. 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 So far EEG shows right anterior temporal epileptiform discharges, right anterior temporal slowing, and right anterior temporal rhythmic delta activity (suggestive of right anterior temporal hyperexcitability). She has had no clinical or EEG seizure since admission. She is still having spells despite being on good doses of Keppra and Lamictal. Spell characterization is warranted before further medication adjustments are made (my suspicion is that her spells are epileptic in etiology). We will also initiate a pre-surgical work-up given interictal/imaging findings.
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
